# Patient Record
Sex: FEMALE | Race: WHITE | NOT HISPANIC OR LATINO | Employment: FULL TIME | URBAN - NONMETROPOLITAN AREA
[De-identification: names, ages, dates, MRNs, and addresses within clinical notes are randomized per-mention and may not be internally consistent; named-entity substitution may affect disease eponyms.]

---

## 2016-09-27 LAB — EXTERNAL HEPATITIS B SURFACE ANTIGEN: NEGATIVE

## 2016-09-29 LAB
EXTERNAL ABO GROUPING: NORMAL
EXTERNAL ANTIBODY SCREEN: NEGATIVE
EXTERNAL GROUP B STREP ANTIGEN: NEGATIVE
EXTERNAL RH FACTOR: POSITIVE
EXTERNAL RUBELLA QUALITATIVE: NORMAL
EXTERNAL SYPHILIS RPR SCREEN: NEGATIVE
HIV1 AB SPEC QL IA.RAPID: NEGATIVE

## 2017-04-11 ENCOUNTER — APPOINTMENT (OUTPATIENT)
Dept: ULTRASOUND IMAGING | Facility: HOSPITAL | Age: 37
End: 2017-04-11

## 2017-04-11 ENCOUNTER — HOSPITAL ENCOUNTER (OUTPATIENT)
Facility: HOSPITAL | Age: 37
Setting detail: OBSERVATION
Discharge: HOME | End: 2017-04-11
Attending: OBSTETRICS & GYNECOLOGY | Admitting: OBSTETRICS & GYNECOLOGY

## 2017-04-11 VITALS
RESPIRATION RATE: 18 BRPM | HEART RATE: 105 BPM | DIASTOLIC BLOOD PRESSURE: 76 MMHG | SYSTOLIC BLOOD PRESSURE: 140 MMHG | TEMPERATURE: 97.8 F

## 2017-04-11 PROBLEM — Z34.90 PREGNANT AND NOT YET DELIVERED: Status: ACTIVE | Noted: 2017-04-11

## 2017-04-11 LAB
ABO GROUP BLD: NORMAL
BLD GP AB SCN SERPL QL: NEGATIVE
DEPRECATED RDW RBC AUTO: 46 FL (ref 40–54)
ERYTHROCYTE [DISTWIDTH] IN BLOOD BY AUTOMATED COUNT: 14.8 % (ref 12–15)
HCT VFR BLD AUTO: 35.8 % (ref 37–47)
HGB BLD-MCNC: 12.3 G/DL (ref 12–16)
LYMPHOCYTES # BLD MANUAL: 1.08 10*3/MM3 (ref 0.72–4.86)
LYMPHOCYTES NFR BLD MANUAL: 10 % (ref 15–45)
LYMPHOCYTES NFR BLD MANUAL: 8 % (ref 4–12)
MCH RBC QN AUTO: 29.5 PG (ref 28–32)
MCHC RBC AUTO-ENTMCNC: 34.4 G/DL (ref 33–36)
MCV RBC AUTO: 85.9 FL (ref 82–98)
MONOCYTES # BLD AUTO: 0.86 10*3/MM3 (ref 0.19–1.3)
NEUTROPHILS # BLD AUTO: 8.65 10*3/MM3 (ref 1.87–8.4)
NEUTROPHILS NFR BLD MANUAL: 75 % (ref 39–78)
NEUTS BAND NFR BLD MANUAL: 5 % (ref 0–10)
PLAT MORPH BLD: NORMAL
PLATELET # BLD AUTO: 219 10*3/MM3 (ref 130–400)
PMV BLD AUTO: 11 FL (ref 6–12)
POIKILOCYTOSIS BLD QL SMEAR: ABNORMAL
RBC # BLD AUTO: 4.17 10*6/MM3 (ref 4.2–5.4)
RH BLD: POSITIVE
VARIANT LYMPHS NFR BLD MANUAL: 2 % (ref 0–5)
WBC MORPH BLD: NORMAL
WBC NRBC COR # BLD: 10.81 10*3/MM3 (ref 4.8–10.8)

## 2017-04-11 PROCEDURE — G0463 HOSPITAL OUTPT CLINIC VISIT: HCPCS

## 2017-04-11 PROCEDURE — G0378 HOSPITAL OBSERVATION PER HR: HCPCS

## 2017-04-11 PROCEDURE — 85027 COMPLETE CBC AUTOMATED: CPT | Performed by: OBSTETRICS & GYNECOLOGY

## 2017-04-11 PROCEDURE — 85007 BL SMEAR W/DIFF WBC COUNT: CPT | Performed by: OBSTETRICS & GYNECOLOGY

## 2017-04-11 PROCEDURE — 86901 BLOOD TYPING SEROLOGIC RH(D): CPT | Performed by: OBSTETRICS & GYNECOLOGY

## 2017-04-11 PROCEDURE — 76818 FETAL BIOPHYS PROFILE W/NST: CPT

## 2017-04-11 PROCEDURE — 86850 RBC ANTIBODY SCREEN: CPT | Performed by: OBSTETRICS & GYNECOLOGY

## 2017-04-11 PROCEDURE — 86900 BLOOD TYPING SEROLOGIC ABO: CPT | Performed by: OBSTETRICS & GYNECOLOGY

## 2017-04-11 RX ORDER — FERROUS SULFATE 325(65) MG
325 TABLET ORAL
COMMUNITY
End: 2017-04-24 | Stop reason: HOSPADM

## 2017-04-11 RX ADMIN — SODIUM CHLORIDE, POTASSIUM CHLORIDE, SODIUM LACTATE AND CALCIUM CHLORIDE 1000 ML: 600; 310; 30; 20 INJECTION, SOLUTION INTRAVENOUS at 11:33

## 2017-04-11 RX ADMIN — SODIUM CHLORIDE, POTASSIUM CHLORIDE, SODIUM LACTATE AND CALCIUM CHLORIDE 1000 ML: 600; 310; 30; 20 INJECTION, SOLUTION INTRAVENOUS at 11:05

## 2017-04-11 NOTE — NURSING NOTE
EDUCATED PATIENT ON REASONS TO RETURN TO LABOR AND DELIVERY INCLUDING LEAKING/GUSH OF FLUID, DECREASED FETAL MOVEMENT, VAGINAL BLEEDING, AND CONTRACTIONS

## 2017-04-21 RX ORDER — PROMETHAZINE HYDROCHLORIDE 25 MG/1
12.5 TABLET ORAL EVERY 6 HOURS PRN
Status: CANCELLED | OUTPATIENT
Start: 2017-04-21

## 2017-04-21 RX ORDER — ZOLPIDEM TARTRATE 5 MG/1
5 TABLET ORAL NIGHTLY PRN
Status: CANCELLED | OUTPATIENT
Start: 2017-04-21 | End: 2017-05-01

## 2017-04-21 RX ORDER — OXYCODONE HYDROCHLORIDE AND ACETAMINOPHEN 5; 325 MG/1; MG/1
2 TABLET ORAL EVERY 4 HOURS PRN
Status: CANCELLED | OUTPATIENT
Start: 2017-04-21 | End: 2017-05-01

## 2017-04-21 RX ORDER — TERBUTALINE SULFATE 1 MG/ML
0.25 INJECTION, SOLUTION SUBCUTANEOUS AS NEEDED
Status: CANCELLED | OUTPATIENT
Start: 2017-04-21

## 2017-04-21 RX ORDER — OXYTOCIN/0.9 % SODIUM CHLORIDE 30/500 ML
2 PLASTIC BAG, INJECTION (ML) INTRAVENOUS
Status: CANCELLED | OUTPATIENT
Start: 2017-04-21 | End: 2017-04-22

## 2017-04-21 RX ORDER — PROMETHAZINE HYDROCHLORIDE 12.5 MG/1
12.5 SUPPOSITORY RECTAL EVERY 6 HOURS PRN
Status: CANCELLED | OUTPATIENT
Start: 2017-04-21

## 2017-04-21 RX ORDER — ACETAMINOPHEN 325 MG/1
650 TABLET ORAL EVERY 4 HOURS PRN
Status: CANCELLED | OUTPATIENT
Start: 2017-04-21

## 2017-04-21 RX ORDER — PROMETHAZINE HYDROCHLORIDE 25 MG/ML
12.5 INJECTION, SOLUTION INTRAMUSCULAR; INTRAVENOUS EVERY 6 HOURS PRN
Status: CANCELLED | OUTPATIENT
Start: 2017-04-21

## 2017-04-21 RX ORDER — CALCIUM CARBONATE 200(500)MG
2 TABLET,CHEWABLE ORAL 3 TIMES DAILY PRN
Status: CANCELLED | OUTPATIENT
Start: 2017-04-21

## 2017-04-21 RX ORDER — HYDROCODONE BITARTRATE AND ACETAMINOPHEN 5; 325 MG/1; MG/1
1 TABLET ORAL EVERY 4 HOURS PRN
Status: CANCELLED | OUTPATIENT
Start: 2017-04-21 | End: 2017-05-01

## 2017-04-21 RX ORDER — OXYTOCIN/RINGER'S LACTATE 20/1000 ML
999 PLASTIC BAG, INJECTION (ML) INTRAVENOUS ONCE
Status: CANCELLED | OUTPATIENT
Start: 2017-04-21 | End: 2017-04-21

## 2017-04-21 NOTE — H&P
"      Patient Care Team:  Tisha Gonzalez DO as PCP - General (Family Medicine)         Subjective     History of Present Illness     36 year old female presents at term gestation for induction of labor.  Prenatal complicated by gestational edema with proteinuria  AMA    Review of Systems   Constitutional: Negative.    HENT: Negative.    Eyes: Negative.    Respiratory: Negative.    Cardiovascular: Negative.    Gastrointestinal: Negative.    Endocrine: Negative.    Genitourinary: Negative.    Musculoskeletal: Negative.    Skin: Negative.    Allergic/Immunologic: Negative.    Neurological: Negative.    Hematological: Negative.    Psychiatric/Behavioral: Negative.       PMHX:  Childhood asthma  PSH:  Denies  SHX:  Denies tobacco, drug or alcohol usage  NKDA  FHX:  CAD  GYNHX:  Neg  OBHX:    FT   Med:  PNV    Objective      Vital Signs  Ht:  67.5\"  Wt:  200  BP: 148/88  P:  90  R:  17  Physical Exam   Constitutional: She is oriented to person, place, and time. She appears well-developed and well-nourished.   Eyes: EOM are normal.   Neck: Normal range of motion. Neck supple.   Cardiovascular: Normal rate, regular rhythm, normal heart sounds and intact distal pulses.    Pulmonary/Chest: Effort normal and breath sounds normal.   Abdominal: Soft. Bowel sounds are normal.   Genitourinary: Vagina normal.   Genitourinary Comments: Uterus gravid nontender  VE:  2/80/-2   Musculoskeletal: Normal range of motion. She exhibits edema.   Neurological: She is alert and oriented to person, place, and time. She has normal reflexes.   Skin: Skin is warm.   Psychiatric: She has a normal mood and affect. Her behavior is normal. Judgment and thought content normal.   Vitals reviewed.      Results Review:   I reviewed the patient's new clinical results.      Assessment & Plan    I discussed the patients findings and my recommendations with patient  1.  Pregnancy at 39+ weeks gestation  2.  AMA  3.  Gestational edema with " proteinuria    1.  Induction of labor    Gloria Gutierrez DO  04/21/17  1:36 PM

## 2017-04-22 ENCOUNTER — ANESTHESIA (OUTPATIENT)
Dept: LABOR AND DELIVERY | Facility: HOSPITAL | Age: 37
End: 2017-04-22

## 2017-04-22 ENCOUNTER — HOSPITAL ENCOUNTER (INPATIENT)
Facility: HOSPITAL | Age: 37
LOS: 2 days | Discharge: HOME | End: 2017-04-24
Attending: OBSTETRICS & GYNECOLOGY | Admitting: OBSTETRICS & GYNECOLOGY

## 2017-04-22 ENCOUNTER — ANESTHESIA EVENT (OUTPATIENT)
Dept: LABOR AND DELIVERY | Facility: HOSPITAL | Age: 37
End: 2017-04-22

## 2017-04-22 DIAGNOSIS — Z3A.38 38 WEEKS GESTATION OF PREGNANCY: ICD-10-CM

## 2017-04-22 DIAGNOSIS — Z34.93 PREGNANT AND NOT YET DELIVERED, THIRD TRIMESTER: Primary | ICD-10-CM

## 2017-04-22 LAB
A1 MICROGLOB PLACENTAL VAG QL: POSITIVE
ABO GROUP BLD: NORMAL
BLD GP AB SCN SERPL QL: NEGATIVE
DEPRECATED RDW RBC AUTO: 45.6 FL (ref 40–54)
ERYTHROCYTE [DISTWIDTH] IN BLOOD BY AUTOMATED COUNT: 14.7 % (ref 12–15)
HCT VFR BLD AUTO: 36.6 % (ref 37–47)
HGB BLD-MCNC: 12.4 G/DL (ref 12–16)
MCH RBC QN AUTO: 29 PG (ref 28–32)
MCHC RBC AUTO-ENTMCNC: 33.9 G/DL (ref 33–36)
MCV RBC AUTO: 85.7 FL (ref 82–98)
PLATELET # BLD AUTO: 247 10*3/MM3 (ref 130–400)
PMV BLD AUTO: 11 FL (ref 6–12)
RBC # BLD AUTO: 4.27 10*6/MM3 (ref 4.2–5.4)
RH BLD: POSITIVE
WBC NRBC COR # BLD: 11.81 10*3/MM3 (ref 4.8–10.8)

## 2017-04-22 PROCEDURE — 59409 OBSTETRICAL CARE: CPT | Performed by: OBSTETRICS & GYNECOLOGY

## 2017-04-22 PROCEDURE — 86900 BLOOD TYPING SEROLOGIC ABO: CPT | Performed by: OBSTETRICS & GYNECOLOGY

## 2017-04-22 PROCEDURE — 25010000002 ROPIVACAINE PER 1 MG: Performed by: NURSE ANESTHETIST, CERTIFIED REGISTERED

## 2017-04-22 PROCEDURE — C1755 CATHETER, INTRASPINAL: HCPCS | Performed by: NURSE ANESTHETIST, CERTIFIED REGISTERED

## 2017-04-22 PROCEDURE — 0KQM0ZZ REPAIR PERINEUM MUSCLE, OPEN APPROACH: ICD-10-PCS | Performed by: OBSTETRICS & GYNECOLOGY

## 2017-04-22 PROCEDURE — 86901 BLOOD TYPING SEROLOGIC RH(D): CPT | Performed by: OBSTETRICS & GYNECOLOGY

## 2017-04-22 PROCEDURE — 85027 COMPLETE CBC AUTOMATED: CPT | Performed by: OBSTETRICS & GYNECOLOGY

## 2017-04-22 PROCEDURE — 4A1HX4Z MONITORING OF PRODUCTS OF CONCEPTION, CARDIAC ELECTRICAL ACTIVITY, EXTERNAL APPROACH: ICD-10-PCS | Performed by: OBSTETRICS & GYNECOLOGY

## 2017-04-22 PROCEDURE — 86850 RBC ANTIBODY SCREEN: CPT | Performed by: OBSTETRICS & GYNECOLOGY

## 2017-04-22 PROCEDURE — 84112 EVAL AMNIOTIC FLUID PROTEIN: CPT | Performed by: OBSTETRICS & GYNECOLOGY

## 2017-04-22 PROCEDURE — 88307 TISSUE EXAM BY PATHOLOGIST: CPT | Performed by: OBSTETRICS & GYNECOLOGY

## 2017-04-22 RX ORDER — PROMETHAZINE HYDROCHLORIDE 12.5 MG/1
12.5 SUPPOSITORY RECTAL EVERY 6 HOURS PRN
Status: DISCONTINUED | OUTPATIENT
Start: 2017-04-22 | End: 2017-04-23 | Stop reason: HOSPADM

## 2017-04-22 RX ORDER — TERBUTALINE SULFATE 1 MG/ML
0.25 INJECTION, SOLUTION SUBCUTANEOUS AS NEEDED
Status: DISCONTINUED | OUTPATIENT
Start: 2017-04-22 | End: 2017-04-23 | Stop reason: HOSPADM

## 2017-04-22 RX ORDER — PROMETHAZINE HYDROCHLORIDE 25 MG/ML
12.5 INJECTION, SOLUTION INTRAMUSCULAR; INTRAVENOUS EVERY 6 HOURS PRN
Status: DISCONTINUED | OUTPATIENT
Start: 2017-04-22 | End: 2017-04-23 | Stop reason: HOSPADM

## 2017-04-22 RX ORDER — PROMETHAZINE HYDROCHLORIDE 25 MG/1
12.5 TABLET ORAL EVERY 6 HOURS PRN
Status: DISCONTINUED | OUTPATIENT
Start: 2017-04-22 | End: 2017-04-23 | Stop reason: HOSPADM

## 2017-04-22 RX ORDER — HYDROCODONE BITARTRATE AND ACETAMINOPHEN 5; 325 MG/1; MG/1
1 TABLET ORAL EVERY 4 HOURS PRN
Status: DISCONTINUED | OUTPATIENT
Start: 2017-04-22 | End: 2017-04-23 | Stop reason: HOSPADM

## 2017-04-22 RX ORDER — SODIUM CHLORIDE, SODIUM LACTATE, POTASSIUM CHLORIDE, CALCIUM CHLORIDE 600; 310; 30; 20 MG/100ML; MG/100ML; MG/100ML; MG/100ML
125 INJECTION, SOLUTION INTRAVENOUS CONTINUOUS
Status: DISCONTINUED | OUTPATIENT
Start: 2017-04-22 | End: 2017-04-23

## 2017-04-22 RX ORDER — OXYTOCIN/RINGER'S LACTATE 20/1000 ML
125 PLASTIC BAG, INJECTION (ML) INTRAVENOUS CONTINUOUS PRN
Status: DISCONTINUED | OUTPATIENT
Start: 2017-04-22 | End: 2017-04-23

## 2017-04-22 RX ORDER — IBUPROFEN 800 MG/1
800 TABLET ORAL EVERY 8 HOURS PRN
Status: DISCONTINUED | OUTPATIENT
Start: 2017-04-22 | End: 2017-04-23 | Stop reason: HOSPADM

## 2017-04-22 RX ORDER — ROPIVACAINE HYDROCHLORIDE 2 MG/ML
INJECTION, SOLUTION EPIDURAL; INFILTRATION; PERINEURAL AS NEEDED
Status: DISCONTINUED | OUTPATIENT
Start: 2017-04-22 | End: 2017-04-23 | Stop reason: SURG

## 2017-04-22 RX ORDER — METHYLERGONOVINE MALEATE 0.2 MG/ML
200 INJECTION INTRAVENOUS AS NEEDED
Status: DISCONTINUED | OUTPATIENT
Start: 2017-04-22 | End: 2017-04-23 | Stop reason: HOSPADM

## 2017-04-22 RX ORDER — LIDOCAINE HYDROCHLORIDE AND EPINEPHRINE 15; 5 MG/ML; UG/ML
INJECTION, SOLUTION EPIDURAL AS NEEDED
Status: DISCONTINUED | OUTPATIENT
Start: 2017-04-22 | End: 2017-04-23 | Stop reason: SURG

## 2017-04-22 RX ORDER — BUTORPHANOL TARTRATE 1 MG/ML
1 INJECTION, SOLUTION INTRAMUSCULAR; INTRAVENOUS
Status: DISCONTINUED | OUTPATIENT
Start: 2017-04-22 | End: 2017-04-23 | Stop reason: HOSPADM

## 2017-04-22 RX ORDER — OXYTOCIN/RINGER'S LACTATE 20/1000 ML
999 PLASTIC BAG, INJECTION (ML) INTRAVENOUS ONCE
Status: COMPLETED | OUTPATIENT
Start: 2017-04-22 | End: 2017-04-22

## 2017-04-22 RX ORDER — MISOPROSTOL 200 UG/1
800 TABLET ORAL AS NEEDED
Status: DISCONTINUED | OUTPATIENT
Start: 2017-04-22 | End: 2017-04-23 | Stop reason: HOSPADM

## 2017-04-22 RX ORDER — SODIUM CHLORIDE 0.9 % (FLUSH) 0.9 %
1-10 SYRINGE (ML) INJECTION AS NEEDED
Status: DISCONTINUED | OUTPATIENT
Start: 2017-04-22 | End: 2017-04-23 | Stop reason: HOSPADM

## 2017-04-22 RX ORDER — OXYTOCIN/0.9 % SODIUM CHLORIDE 30/500 ML
2-30 PLASTIC BAG, INJECTION (ML) INTRAVENOUS
Status: DISCONTINUED | OUTPATIENT
Start: 2017-04-22 | End: 2017-04-23

## 2017-04-22 RX ORDER — CARBOPROST TROMETHAMINE 250 UG/ML
250 INJECTION, SOLUTION INTRAMUSCULAR AS NEEDED
Status: DISCONTINUED | OUTPATIENT
Start: 2017-04-22 | End: 2017-04-23 | Stop reason: HOSPADM

## 2017-04-22 RX ORDER — LIDOCAINE HYDROCHLORIDE 10 MG/ML
5 INJECTION, SOLUTION INFILTRATION; PERINEURAL AS NEEDED
Status: DISCONTINUED | OUTPATIENT
Start: 2017-04-22 | End: 2017-04-23 | Stop reason: HOSPADM

## 2017-04-22 RX ADMIN — SODIUM CHLORIDE, POTASSIUM CHLORIDE, SODIUM LACTATE AND CALCIUM CHLORIDE 125 ML/HR: 600; 310; 30; 20 INJECTION, SOLUTION INTRAVENOUS at 19:00

## 2017-04-22 RX ADMIN — ROPIVACAINE HYDROCHLORIDE 10 ML/HR: 2 INJECTION, SOLUTION EPIDURAL; INFILTRATION at 22:05

## 2017-04-22 RX ADMIN — LIDOCAINE HYDROCHLORIDE,EPINEPHRINE BITARTRATE 3 ML: 15; .005 INJECTION, SOLUTION EPIDURAL; INFILTRATION; INTRACAUDAL; PERINEURAL at 21:59

## 2017-04-22 RX ADMIN — SODIUM CHLORIDE, POTASSIUM CHLORIDE, SODIUM LACTATE AND CALCIUM CHLORIDE 125 ML/HR: 600; 310; 30; 20 INJECTION, SOLUTION INTRAVENOUS at 22:20

## 2017-04-22 RX ADMIN — ROPIVACAINE HYDROCHLORIDE 10 ML: 2 INJECTION, SOLUTION EPIDURAL; INFILTRATION at 22:05

## 2017-04-22 RX ADMIN — Medication 19.98 UNITS/HR: at 23:16

## 2017-04-22 RX ADMIN — OXYTOCIN-SODIUM CHLORIDE 0.9% IV SOLN 30 UNIT/500ML 2 MILLI-UNITS/MIN: 30-0.9/5 SOLUTION at 20:51

## 2017-04-23 LAB
BASOPHILS # BLD AUTO: 0.02 10*3/MM3 (ref 0–0.2)
BASOPHILS NFR BLD AUTO: 0.1 % (ref 0–2)
DEPRECATED RDW RBC AUTO: 46 FL (ref 40–54)
EOSINOPHIL # BLD AUTO: 0.04 10*3/MM3 (ref 0–0.7)
EOSINOPHIL NFR BLD AUTO: 0.3 % (ref 0–4)
ERYTHROCYTE [DISTWIDTH] IN BLOOD BY AUTOMATED COUNT: 14.6 % (ref 12–15)
HCT VFR BLD AUTO: 33.6 % (ref 37–47)
HGB BLD-MCNC: 11.4 G/DL (ref 12–16)
IMM GRANULOCYTES # BLD: 0.05 10*3/MM3 (ref 0–0.03)
IMM GRANULOCYTES NFR BLD: 0.3 % (ref 0–5)
LYMPHOCYTES # BLD AUTO: 2.19 10*3/MM3 (ref 0.72–4.86)
LYMPHOCYTES NFR BLD AUTO: 13.9 % (ref 15–45)
MCH RBC QN AUTO: 29 PG (ref 28–32)
MCHC RBC AUTO-ENTMCNC: 33.9 G/DL (ref 33–36)
MCV RBC AUTO: 85.5 FL (ref 82–98)
MONOCYTES # BLD AUTO: 1.15 10*3/MM3 (ref 0.19–1.3)
MONOCYTES NFR BLD AUTO: 7.3 % (ref 4–12)
NEUTROPHILS # BLD AUTO: 12.25 10*3/MM3 (ref 1.87–8.4)
NEUTROPHILS NFR BLD AUTO: 78.1 % (ref 39–78)
PLATELET # BLD AUTO: 218 10*3/MM3 (ref 130–400)
PMV BLD AUTO: 10.5 FL (ref 6–12)
RBC # BLD AUTO: 3.93 10*6/MM3 (ref 4.2–5.4)
WBC NRBC COR # BLD: 15.7 10*3/MM3 (ref 4.8–10.8)

## 2017-04-23 PROCEDURE — 85025 COMPLETE CBC W/AUTO DIFF WBC: CPT | Performed by: OBSTETRICS & GYNECOLOGY

## 2017-04-23 PROCEDURE — 90715 TDAP VACCINE 7 YRS/> IM: CPT | Performed by: OBSTETRICS & GYNECOLOGY

## 2017-04-23 PROCEDURE — 25010000002 TDAP 5-2.5-18.5 LF-MCG/0.5 SUSPENSION: Performed by: OBSTETRICS & GYNECOLOGY

## 2017-04-23 PROCEDURE — 90471 IMMUNIZATION ADMIN: CPT | Performed by: OBSTETRICS & GYNECOLOGY

## 2017-04-23 RX ORDER — PROMETHAZINE HYDROCHLORIDE 25 MG/ML
12.5 INJECTION, SOLUTION INTRAMUSCULAR; INTRAVENOUS EVERY 6 HOURS PRN
Status: DISCONTINUED | OUTPATIENT
Start: 2017-04-23 | End: 2017-04-24 | Stop reason: HOSPADM

## 2017-04-23 RX ORDER — PRENATAL VIT/IRON FUM/FOLIC AC 27MG-0.8MG
1 TABLET ORAL DAILY
Status: DISCONTINUED | OUTPATIENT
Start: 2017-04-23 | End: 2017-04-24 | Stop reason: HOSPADM

## 2017-04-23 RX ORDER — CALCIUM CARBONATE 200(500)MG
2 TABLET,CHEWABLE ORAL 3 TIMES DAILY PRN
Status: DISCONTINUED | OUTPATIENT
Start: 2017-04-23 | End: 2017-04-24 | Stop reason: HOSPADM

## 2017-04-23 RX ORDER — OXYCODONE HYDROCHLORIDE AND ACETAMINOPHEN 5; 325 MG/1; MG/1
2 TABLET ORAL EVERY 4 HOURS PRN
Status: DISCONTINUED | OUTPATIENT
Start: 2017-04-23 | End: 2017-04-24 | Stop reason: HOSPADM

## 2017-04-23 RX ORDER — SODIUM CHLORIDE 0.9 % (FLUSH) 0.9 %
1-10 SYRINGE (ML) INJECTION AS NEEDED
Status: DISCONTINUED | OUTPATIENT
Start: 2017-04-23 | End: 2017-04-24 | Stop reason: HOSPADM

## 2017-04-23 RX ORDER — PROMETHAZINE HYDROCHLORIDE 12.5 MG/1
12.5 SUPPOSITORY RECTAL EVERY 6 HOURS PRN
Status: DISCONTINUED | OUTPATIENT
Start: 2017-04-23 | End: 2017-04-24 | Stop reason: HOSPADM

## 2017-04-23 RX ORDER — BISACODYL 10 MG
10 SUPPOSITORY, RECTAL RECTAL DAILY PRN
Status: DISCONTINUED | OUTPATIENT
Start: 2017-04-23 | End: 2017-04-24 | Stop reason: HOSPADM

## 2017-04-23 RX ORDER — HYDROCODONE BITARTRATE AND ACETAMINOPHEN 5; 325 MG/1; MG/1
1 TABLET ORAL EVERY 4 HOURS PRN
Status: DISCONTINUED | OUTPATIENT
Start: 2017-04-23 | End: 2017-04-24 | Stop reason: HOSPADM

## 2017-04-23 RX ORDER — ZOLPIDEM TARTRATE 5 MG/1
5 TABLET ORAL NIGHTLY PRN
Status: DISCONTINUED | OUTPATIENT
Start: 2017-04-23 | End: 2017-04-24 | Stop reason: HOSPADM

## 2017-04-23 RX ORDER — PROMETHAZINE HYDROCHLORIDE 25 MG/1
12.5 TABLET ORAL EVERY 6 HOURS PRN
Status: DISCONTINUED | OUTPATIENT
Start: 2017-04-23 | End: 2017-04-24 | Stop reason: HOSPADM

## 2017-04-23 RX ORDER — DOCUSATE SODIUM 100 MG/1
100 CAPSULE, LIQUID FILLED ORAL 2 TIMES DAILY
Status: DISCONTINUED | OUTPATIENT
Start: 2017-04-23 | End: 2017-04-24 | Stop reason: HOSPADM

## 2017-04-23 RX ORDER — HYDROCODONE BITARTRATE AND ACETAMINOPHEN 5; 325 MG/1; MG/1
1 TABLET ORAL EVERY 4 HOURS PRN
Status: DISCONTINUED | OUTPATIENT
Start: 2017-04-23 | End: 2017-04-23 | Stop reason: SDUPTHER

## 2017-04-23 RX ORDER — PROMETHAZINE HYDROCHLORIDE 25 MG/1
25 TABLET ORAL EVERY 6 HOURS PRN
Status: DISCONTINUED | OUTPATIENT
Start: 2017-04-23 | End: 2017-04-24 | Stop reason: HOSPADM

## 2017-04-23 RX ORDER — IBUPROFEN 800 MG/1
800 TABLET ORAL EVERY 8 HOURS PRN
Status: DISCONTINUED | OUTPATIENT
Start: 2017-04-23 | End: 2017-04-24 | Stop reason: HOSPADM

## 2017-04-23 RX ORDER — ACETAMINOPHEN 325 MG/1
650 TABLET ORAL EVERY 4 HOURS PRN
Status: DISCONTINUED | OUTPATIENT
Start: 2017-04-23 | End: 2017-04-24 | Stop reason: HOSPADM

## 2017-04-23 RX ADMIN — ACETAMINOPHEN 650 MG: 325 TABLET ORAL at 08:50

## 2017-04-23 RX ADMIN — IBUPROFEN 800 MG: 800 TABLET, FILM COATED ORAL at 03:13

## 2017-04-23 RX ADMIN — IBUPROFEN 800 MG: 800 TABLET, FILM COATED ORAL at 14:35

## 2017-04-23 RX ADMIN — DOCUSATE SODIUM 100 MG: 100 CAPSULE, LIQUID FILLED ORAL at 18:23

## 2017-04-23 RX ADMIN — TETANUS TOXOID, REDUCED DIPHTHERIA TOXOID AND ACELLULAR PERTUSSIS VACCINE, ADSORBED 0.5 ML: 5; 2.5; 8; 8; 2.5 SUSPENSION INTRAMUSCULAR at 16:51

## 2017-04-23 RX ADMIN — DOCUSATE SODIUM 100 MG: 100 CAPSULE, LIQUID FILLED ORAL at 08:43

## 2017-04-23 RX ADMIN — Medication 125 ML/HR: at 00:21

## 2017-04-23 RX ADMIN — PRENATAL VIT W/ FE FUMARATE-FA TAB 27-0.8 MG 1 TABLET: 27-0.8 TAB at 08:43

## 2017-04-23 NOTE — ANESTHESIA POSTPROCEDURE EVALUATION
Patient: Triston Galeas    Procedure Summary     Date Anesthesia Start Anesthesia Stop Room / Location    04/22/17 8743 [id number]        Procedure Diagnosis Scheduled Providers Provider    LABOR ANALGESIA No diagnosis on file.  Mauro Canales CRNA          Anesthesia Type: epidural  Last vitals  BP      Temp      Pulse     Resp      SpO2        Post Anesthesia Care and Evaluation    Patient location during evaluation: floor  Patient participation: complete - patient participated  Level of consciousness: awake and alert  Pain score: 1  Pain management: adequate  Airway patency: patent  Anesthetic complications: No anesthetic complications    Cardiovascular status: acceptable  Respiratory status: room air  Hydration status: acceptable  Post Neuraxial Block status: Motor and sensory function returned to baseline and No signs or symptoms of PDPH

## 2017-04-23 NOTE — ANESTHESIA PREPROCEDURE EVALUATION
Anesthesia Evaluation     NPO Status: > 6 hours   Airway   Mallampati: I  TM distance: >3 FB  Neck ROM: full  no difficulty expected  Dental - normal exam     Pulmonary - negative pulmonary ROS and normal exam   Cardiovascular - negative cardio ROS and normal exam        Neuro/Psych- negative ROS  GI/Hepatic/Renal/Endo - negative ROS     Musculoskeletal (-) negative ROS    Abdominal  - normal exam   Substance History - negative use     OB/GYN    (+) Pregnant,     Comment:  247      Other - negative ROS                                   Anesthesia Plan    ASA 2     epidural     Anesthetic plan and risks discussed with patient and spouse/significant other.

## 2017-04-23 NOTE — ANESTHESIA PROCEDURE NOTES
Labor Epidural    Patient location during procedure: OB  Performed By  CRNA: PRINCE MCKINNEY  Preanesthetic Checklist  Completed: patient identified, site marked, surgical consent, pre-op evaluation, timeout performed, IV checked, risks and benefits discussed and monitors and equipment checked  Additional Notes  X 2  ATTEMPT, HEIDI WITH SALINE  Epidural Block Prep:  Pt Position:sitting  Sterile Tech:gloves, mask and sterile barrier  Prep:DuraPrep  Monitoring:blood pressure monitoring and continuous pulse oximetry  Epidural Block Procedure:  Approach:midline  Guidance:landmark technique and palpation technique  Location:L3-L4  Needle Type:Tuohy  Needle Gauge:17 G  Loss of Resistance: 8cm  Cath Depth at skin:12 cm  Aspiration:negative  Test Dose:negative  Post Assessment:  Dressing:occlusive dressing applied and secured with tape  Pt Tolerance:patient tolerated the procedure well with no apparent complications  Complications:yes

## 2017-04-24 VITALS
HEART RATE: 86 BPM | SYSTOLIC BLOOD PRESSURE: 131 MMHG | WEIGHT: 199 LBS | BODY MASS INDEX: 31.23 KG/M2 | HEIGHT: 67 IN | OXYGEN SATURATION: 100 % | RESPIRATION RATE: 16 BRPM | TEMPERATURE: 98.4 F | DIASTOLIC BLOOD PRESSURE: 73 MMHG

## 2017-04-24 RX ORDER — IBUPROFEN 800 MG/1
800 TABLET ORAL EVERY 8 HOURS PRN
Qty: 90 TABLET | Refills: 0 | Status: SHIPPED | OUTPATIENT
Start: 2017-04-24 | End: 2020-01-23

## 2017-04-24 RX ORDER — PRENATAL VIT/IRON FUM/FOLIC AC 27MG-0.8MG
1 TABLET ORAL DAILY
Qty: 30 TABLET | Refills: 3 | Status: SHIPPED | OUTPATIENT
Start: 2017-04-24 | End: 2020-01-23

## 2017-04-24 RX ADMIN — DOCUSATE SODIUM 100 MG: 100 CAPSULE, LIQUID FILLED ORAL at 08:44

## 2017-04-24 RX ADMIN — PRENATAL VIT W/ FE FUMARATE-FA TAB 27-0.8 MG 1 TABLET: 27-0.8 TAB at 08:44

## 2017-04-25 ENCOUNTER — HOSPITAL ENCOUNTER (OUTPATIENT)
Dept: LABOR AND DELIVERY | Facility: HOSPITAL | Age: 37
End: 2017-04-25

## 2017-04-26 ENCOUNTER — HOSPITAL ENCOUNTER (OUTPATIENT)
Dept: LABOR AND DELIVERY | Facility: HOSPITAL | Age: 37
End: 2017-04-26

## 2017-04-26 ENCOUNTER — HOSPITAL ENCOUNTER (OUTPATIENT)
Dept: LACTATION | Facility: HOSPITAL | Age: 37
Discharge: HOME | End: 2017-04-26

## 2017-04-26 LAB
CYTO UR: NORMAL
LAB AP CASE REPORT: NORMAL
LAB AP CLINICAL INFORMATION: NORMAL
Lab: NORMAL
PATH REPORT.FINAL DX SPEC: NORMAL
PATH REPORT.GROSS SPEC: NORMAL

## 2017-04-26 NOTE — LACTATION NOTE
Infant, Nikhil, 4 days old  Liliana Rivera  :  17  Birth Weight:  7-10.4  3470G  DC Weight:  7-6.4 3557G  3.3% weight loss at DC    Weight today:  7-10.3  3468 G    Back to birth weight.    Parents presented for weight check only.  Triston states BFing going well. No pain, nipple trauma or issues with latch. She is feeding every 2-3 hours both breasts per her report. BF'ed 3 year old daughter. No concerns. Lactation contact card and invitation to [facility] given. Much praise for early BFing success.    Weight info faxed to Dr. Ford 17, 10:30 am.

## 2018-10-03 ENCOUNTER — OFFICE VISIT (OUTPATIENT)
Dept: RETAIL CLINIC | Facility: CLINIC | Age: 38
End: 2018-10-03

## 2018-10-03 VITALS
RESPIRATION RATE: 18 BRPM | BODY MASS INDEX: 24.77 KG/M2 | TEMPERATURE: 98.3 F | HEIGHT: 68 IN | HEART RATE: 84 BPM | WEIGHT: 163.4 LBS | DIASTOLIC BLOOD PRESSURE: 70 MMHG | SYSTOLIC BLOOD PRESSURE: 120 MMHG | OXYGEN SATURATION: 99 %

## 2018-10-03 DIAGNOSIS — J02.9 ACUTE PHARYNGITIS, UNSPECIFIED ETIOLOGY: Primary | ICD-10-CM

## 2018-10-03 DIAGNOSIS — R05.9 COUGHING: ICD-10-CM

## 2018-10-03 PROCEDURE — 99213 OFFICE O/P EST LOW 20 MIN: CPT | Performed by: NURSE PRACTITIONER

## 2018-10-03 RX ORDER — AMOXICILLIN 875 MG/1
875 TABLET, COATED ORAL 2 TIMES DAILY
Qty: 20 TABLET | Refills: 0 | Status: SHIPPED | OUTPATIENT
Start: 2018-10-03 | End: 2018-10-13

## 2018-10-03 RX ORDER — CETIRIZINE HYDROCHLORIDE 10 MG/1
10 TABLET ORAL DAILY
COMMUNITY
End: 2018-12-13 | Stop reason: ALTCHOICE

## 2018-10-03 NOTE — PATIENT INSTRUCTIONS
Pharyngitis  Pharyngitis is redness, pain, and swelling (inflammation) of the throat (pharynx). It is a very common cause of sore throat. Pharyngitis can be caused by a bacteria, but it is usually caused by a virus. Most cases of pharyngitis get better on their own without treatment.  What are the causes?  This condition may be caused by:  · Infection by viruses (viral). Viral pharyngitis spreads from person to person (is contagious) through coughing, sneezing, and sharing of personal items or utensils such as cups, forks, spoons, and toothbrushes.  · Infection by bacteria (bacterial). Bacterial pharyngitis may be spread by touching the nose or face after coming in contact with the bacteria, or through more intimate contact, such as kissing.  · Allergies. Allergies can cause buildup of mucus in the throat (post-nasal drip), leading to inflammation and irritation. Allergies can also cause blocked nasal passages, forcing breathing through the mouth, which dries and irritates the throat.    What increases the risk?  You are more likely to develop this condition if:  · You are 5-24 years old.  · You are exposed to crowded environments such as , school, or dormitory living.  · You live in a cold climate.  · You have a weakened disease-fighting (immune) system.    What are the signs or symptoms?  Symptoms of this condition vary by the cause (viral, bacterial, or allergies) and can include:  · Sore throat.  · Fatigue.  · Low-grade fever.  · Headache.  · Joint pain and muscle aches.  · Skin rashes.  · Swollen glands in the throat (lymph nodes).  · Plaque-like film on the throat or tonsils. This is often a symptom of bacterial pharyngitis.  · Vomiting.  · Stuffy nose (nasal congestion).  · Cough.  · Red, itchy eyes (conjunctivitis).  · Loss of appetite.    How is this diagnosed?  This condition is often diagnosed based on your medical history and a physical exam. Your health care provider will ask you questions about  your illness and your symptoms. A swab of your throat may be done to check for bacteria (rapid strep test). Other lab tests may also be done, depending on the suspected cause, but these are rare.  How is this treated?  This condition usually gets better in 3-4 days without medicine. Bacterial pharyngitis may be treated with antibiotic medicines.  Follow these instructions at home:  · Take over-the-counter and prescription medicines only as told by your health care provider.  ? If you were prescribed an antibiotic medicine, take it as told by your health care provider. Do not stop taking the antibiotic even if you start to feel better.  ? Do not give children aspirin because of the association with Reye syndrome.  · Drink enough water and fluids to keep your urine clear or pale yellow.  · Get a lot of rest.  · Gargle with a salt-water mixture 3-4 times a day or as needed. To make a salt-water mixture, completely dissolve ½-1 tsp of salt in 1 cup of warm water.  · If your health care provider approves, you may use throat lozenges or sprays to soothe your throat.  Contact a health care provider if:  · You have large, tender lumps in your neck.  · You have a rash.  · You cough up green, yellow-brown, or bloody spit.  Get help right away if:  · Your neck becomes stiff.  · You drool or are unable to swallow liquids.  · You cannot drink or take medicines without vomiting.  · You have severe pain that does not go away, even after you take medicine.  · You have trouble breathing, and it is not caused by a stuffy nose.  · You have new pain and swelling in your joints such as the knees, ankles, wrists, or elbows.  Summary  · Pharyngitis is redness, pain, and swelling (inflammation) of the throat (pharynx).  · While pharyngitis can be caused by a bacteria, the most common causes are viral.  · Most cases of pharyngitis get better on their own without treatment.  · Bacterial pharyngitis is treated with antibiotic medicines.  This  information is not intended to replace advice given to you by your health care provider. Make sure you discuss any questions you have with your health care provider.  Document Released: 12/18/2006 Document Revised: 01/23/2018 Document Reviewed: 01/23/2018  [employer] Patient Education © 2018 [employer]    Due to the duration of and worsening of your symptoms an antibiotic has been prescribed.  Increase fluid intake.  Gargle warm salt water as needed for comfort. Continue Ibuprofen if needed.  Consider switching Zyrtec to Xyzal as discussed.  Add Flonase if needed for allergies.     If symptoms persist or worsen please follow up with your primary care provider.

## 2018-10-03 NOTE — PROGRESS NOTES
"  Chief Complaint   Patient presents with   • Cough   • Sore Throat     Subjective   Triston Galeas is a 37 y.o. female who presents to the clinic today with complaints of sore throat and cough.   Sore Throat    This is a new problem. Episode onset: about two weeks ago. The problem has been gradually worsening. There has been no fever. Associated symptoms include coughing and ear pain (both ears). Pertinent negatives include no abdominal pain, congestion, diarrhea, headaches, shortness of breath, trouble swallowing or vomiting. Treatments tried: allergy med.     Current Outpatient Prescriptions:   •  Multiple Vitamins-Minerals (MULTIVITAMIN ADULT PO), Take  by mouth., Disp: , Rfl:   •  Probiotic Product (PROBIOTIC DAILY PO), Take  by mouth., Disp: , Rfl:   Zyrtec daily    Allergies:  Patient has no known allergies.    Past Medical History:   Diagnosis Date   • Allergic    • Hx of degenerative disc disease      History reviewed. No pertinent surgical history.  Family History   Problem Relation Age of Onset   • Heart disease Mother      Social History   Substance Use Topics   • Smoking status: Never Smoker   • Smokeless tobacco: Not on file   • Alcohol use No       Review of Systems  Review of Systems   Constitutional: Negative for chills, fatigue and fever.   HENT: Positive for ear pain (both ears), postnasal drip and sore throat. Negative for congestion, sinus pain and trouble swallowing.    Eyes: Negative.    Respiratory: Positive for cough. Negative for shortness of breath and wheezing.    Gastrointestinal: Negative for abdominal pain, constipation, diarrhea, nausea and vomiting.   Allergic/Immunologic: Positive for environmental allergies.   Neurological: Negative for headaches.       Objective   /70   Pulse 84   Temp 98.3 °F (36.8 °C) (Oral)   Resp 18   Ht 171.5 cm (67.5\")   Wt 74.1 kg (163 lb 6.4 oz)   LMP 09/23/2018 (Exact Date)   SpO2 99%   BMI 25.21 kg/m²       Physical Exam "   Constitutional: She is oriented to person, place, and time. She appears well-developed and well-nourished. She is cooperative.  Non-toxic appearance. No distress.   HENT:   Head: Normocephalic and atraumatic.   Right Ear: External ear and ear canal normal. Tympanic membrane is not perforated, not erythematous, not retracted and not bulging. A middle ear effusion is present.   Left Ear: Tympanic membrane, external ear and ear canal normal. Tympanic membrane is not perforated, not erythematous, not retracted and not bulging.  No middle ear effusion.   Nose: Nose normal. No sinus tenderness.   Mouth/Throat: Uvula is midline and mucous membranes are normal. Posterior oropharyngeal erythema present. Tonsils are 1+ on the right. Tonsils are 1+ on the left. No tonsillar exudate.   Eyes: Pupils are equal, round, and reactive to light. Conjunctivae, EOM and lids are normal.   Neck: Trachea normal and normal range of motion. Neck supple.   Cardiovascular: Normal rate, regular rhythm, S1 normal, S2 normal and normal heart sounds.    Pulmonary/Chest: Effort normal and breath sounds normal. She has no wheezes. She has no rhonchi. She has no rales.   Lymphadenopathy:     She has no cervical adenopathy.   Neurological: She is alert and oriented to person, place, and time. Coordination and gait normal.   Skin: Skin is warm, dry and intact.   Psychiatric: She has a normal mood and affect. Her speech is normal and behavior is normal.   Vitals reviewed.      Assessment/Plan     Triston was seen today for cough and sore throat.    Diagnoses and all orders for this visit:    Acute pharyngitis, unspecified etiology    Coughing    Other orders  -     amoxicillin (AMOXIL) 875 MG tablet; Take 1 tablet by mouth 2 (Two) Times a Day for 10 days.      Due to the duration of and worsening of your symptoms an antibiotic has been prescribed.  Increase fluid intake.  Gargle warm salt water as needed for comfort. Continue Ibuprofen if needed.   Consider switching Zyrtec to Xyzal as discussed.  Add Flonase if needed for allergies.     If symptoms persist or worsen please follow up with your primary care provider.

## 2018-12-13 ENCOUNTER — OFFICE VISIT (OUTPATIENT)
Dept: RETAIL CLINIC | Facility: CLINIC | Age: 38
End: 2018-12-13

## 2018-12-13 VITALS
BODY MASS INDEX: 25.13 KG/M2 | HEIGHT: 68 IN | SYSTOLIC BLOOD PRESSURE: 114 MMHG | WEIGHT: 165.8 LBS | RESPIRATION RATE: 18 BRPM | DIASTOLIC BLOOD PRESSURE: 80 MMHG | TEMPERATURE: 98.4 F | HEART RATE: 80 BPM | OXYGEN SATURATION: 98 %

## 2018-12-13 DIAGNOSIS — J01.90 ACUTE NON-RECURRENT SINUSITIS, UNSPECIFIED LOCATION: Primary | ICD-10-CM

## 2018-12-13 DIAGNOSIS — R05.9 COUGHING: ICD-10-CM

## 2018-12-13 PROCEDURE — 99213 OFFICE O/P EST LOW 20 MIN: CPT | Performed by: NURSE PRACTITIONER

## 2018-12-13 RX ORDER — AMOXICILLIN AND CLAVULANATE POTASSIUM 875; 125 MG/1; MG/1
1 TABLET, FILM COATED ORAL 2 TIMES DAILY
Qty: 20 TABLET | Refills: 0 | Status: SHIPPED | OUTPATIENT
Start: 2018-12-13 | End: 2018-12-23

## 2018-12-13 RX ORDER — LEVOCETIRIZINE DIHYDROCHLORIDE 5 MG/1
5 TABLET, FILM COATED ORAL EVERY EVENING
COMMUNITY

## 2018-12-13 RX ORDER — METHYLPREDNISOLONE 4 MG/1
TABLET ORAL
Qty: 1 EACH | Refills: 0 | Status: SHIPPED | OUTPATIENT
Start: 2018-12-13 | End: 2020-01-23

## 2018-12-13 NOTE — PATIENT INSTRUCTIONS
An antibiotic has been prescribed for you that needs to be taken as directed for the full length of treatment. It is recommended that you take a probiotic when taking an antibiotic. Probiotics are found over the counter in pill form and in some brands of yogurt.      Risks and benefits of steroids discussed. Take only as prescribed.   In summary:   · Steroids can increase blood pressure and blood sugar.  · Steroids can cause increased irritability, increased energy & appetite, and may disrupt sleep.   · They may cause sun sensitivity. You should avoid prolonged sun exposure and use sunscreen if outdoors.   · Do not take additional NSAIDS such as ibuprofen, Motrin, Advil, Aleve, Naproxen while taking this medicine.  It is okay to take Tylenol while taking steroids.  · There are additional long term risks of steroids if used long term/frequently.    Increase fluid intake. Take Mucinex DM as needed to thin drainage and reduce cough.     If symptoms are not improving in a few days please follow up with Dr. Gonzalez.  Please seek medical attention sooner if worse.       Cough, Adult  Coughing is a reflex that clears your throat and your airways. Coughing helps to heal and protect your lungs. It is normal to cough occasionally, but a cough that happens with other symptoms or lasts a long time may be a sign of a condition that needs treatment. A cough may last only 2-3 weeks (acute), or it may last longer than 8 weeks (chronic).  What are the causes?  Coughing is commonly caused by:  · Breathing in substances that irritate your lungs.  · A viral or bacterial respiratory infection.  · Allergies.  · Asthma.  · Postnasal drip.  · Smoking.  · Acid backing up from the stomach into the esophagus (gastroesophageal reflux).  · Certain medicines.  · Chronic lung problems, including COPD (or rarely, lung cancer).  · Other medical conditions such as heart failure.    Follow these instructions at home:  Pay attention to any changes in  your symptoms. Take these actions to help with your discomfort:  · Take medicines only as told by your health care provider.  ? If you were prescribed an antibiotic medicine, take it as told by your health care provider. Do not stop taking the antibiotic even if you start to feel better.  ? Talk with your health care provider before you take a cough suppressant medicine.  · Drink enough fluid to keep your urine clear or pale yellow.  · If the air is dry, use a cold steam vaporizer or humidifier in your bedroom or your home to help loosen secretions.  · Avoid anything that causes you to cough at work or at home.  · If your cough is worse at night, try sleeping in a semi-upright position.  · Avoid cigarette smoke. If you smoke, quit smoking. If you need help quitting, ask your health care provider.  · Avoid caffeine.  · Avoid alcohol.  · Rest as needed.    Contact a health care provider if:  · You have new symptoms.  · You cough up pus.  · Your cough does not get better after 2-3 weeks, or your cough gets worse.  · You cannot control your cough with suppressant medicines and you are losing sleep.  · You develop pain that is getting worse or pain that is not controlled with pain medicines.  · You have a fever.  · You have unexplained weight loss.  · You have night sweats.  Get help right away if:  · You cough up blood.  · You have difficulty breathing.  · Your heartbeat is very fast.  This information is not intended to replace advice given to you by your health care provider. Make sure you discuss any questions you have with your health care provider.  Document Released: 06/15/2012 Document Revised: 05/25/2017 Document Reviewed: 02/24/2016  [employer] Interactive Patient Education © 2018 [employer]      Sinusitis, Adult  Sinusitis is soreness and inflammation of your sinuses. Sinuses are hollow spaces in the bones around your face. Your sinuses are located:  · Around your eyes.  · In the middle of your  forehead.  · Behind your nose.  · In your cheekbones.    Your sinuses and nasal passages are lined with a stringy fluid (mucus). Mucus normally drains out of your sinuses. When your nasal tissues become inflamed or swollen, the mucus can become trapped or blocked so air cannot flow through your sinuses. This allows bacteria, viruses, and funguses to grow, which leads to infection.  Sinusitis can develop quickly and last for 7?10 days (acute) or for more than 12 weeks (chronic). Sinusitis often develops after a cold.  What are the causes?  This condition is caused by anything that creates swelling in the sinuses or stops mucus from draining, including:  · Allergies.  · Asthma.  · Bacterial or viral infection.  · Abnormally shaped bones between the nasal passages.  · Nasal growths that contain mucus (nasal polyps).  · Narrow sinus openings.  · Pollutants, such as chemicals or irritants in the air.  · A foreign object stuck in the nose.  · A fungal infection. This is rare.    What increases the risk?  The following factors may make you more likely to develop this condition:  · Having allergies or asthma.  · Having had a recent cold or respiratory tract infection.  · Having structural deformities or blockages in your nose or sinuses.  · Having a weak immune system.  · Doing a lot of swimming or diving.  · Overusing nasal sprays.  · Smoking.    What are the signs or symptoms?  The main symptoms of this condition are pain and a feeling of pressure around the affected sinuses. Other symptoms include:  · Upper toothache.  · Earache.  · Headache.  · Bad breath.  · Decreased sense of smell and taste.  · A cough that may get worse at night.  · Fatigue.  · Fever.  · Thick drainage from your nose. The drainage is often green and it may contain pus (purulent).  · Stuffy nose or congestion.  · Postnasal drip. This is when extra mucus collects in the throat or back of the nose.  · Swelling and warmth over the affected  sinuses.  · Sore throat.  · Sensitivity to light.    How is this diagnosed?  This condition is diagnosed based on symptoms, a medical history, and a physical exam. To find out if your condition is acute or chronic, your health care provider may:  · Look in your nose for signs of nasal polyps.  · Tap over the affected sinus to check for signs of infection.  · View the inside of your sinuses using an imaging device that has a light attached (endoscope).    If your health care provider suspects that you have chronic sinusitis, you may also:  · Be tested for allergies.  · Have a sample of mucus taken from your nose (nasal culture) and checked for bacteria.  · Have a mucus sample examined to see if your sinusitis is related to an allergy.    If your sinusitis does not respond to treatment and it lasts longer than 8 weeks, you may have an MRI or CT scan to check your sinuses. These scans also help to determine how severe your infection is.  In rare cases, a bone biopsy may be done to rule out more serious types of fungal sinus disease.  How is this treated?  Treatment for sinusitis depends on the cause and whether your condition is chronic or acute. If a virus is causing your sinusitis, your symptoms will go away on their own within 10 days. You may be given medicines to relieve your symptoms, including:  · Topical nasal decongestants. They shrink swollen nasal passages and let mucus drain from your sinuses.  · Antihistamines. These drugs block inflammation that is triggered by allergies. This can help to ease swelling in your nose and sinuses.  · Topical nasal corticosteroids. These are nasal sprays that ease inflammation and swelling in your nose and sinuses.  · Nasal saline washes. These rinses can help to get rid of thick mucus in your nose.    If your condition is caused by bacteria, you will be given an antibiotic medicine. If your condition is caused by a fungus, you will be given an antifungal medicine.  Surgery  may be needed to correct underlying conditions, such as narrow nasal passages. Surgery may also be needed to remove polyps.  Follow these instructions at home:  Medicines  · Take, use, or apply over-the-counter and prescription medicines only as told by your health care provider. These may include nasal sprays.  · If you were prescribed an antibiotic medicine, take it as told by your health care provider. Do not stop taking the antibiotic even if you start to feel better.  Hydrate and Humidify  · Drink enough water to keep your urine clear or pale yellow. Staying hydrated will help to thin your mucus.  · Use a cool mist humidifier to keep the humidity level in your home above 50%.  · Inhale steam for 10-15 minutes, 3-4 times a day or as told by your health care provider. You can do this in the bathroom while a hot shower is running.  · Limit your exposure to cool or dry air.  Rest  · Rest as much as possible.  · Sleep with your head raised (elevated).  · Make sure to get enough sleep each night.  General instructions  · Apply a warm, moist washcloth to your face 3-4 times a day or as told by your health care provider. This will help with discomfort.  · Wash your hands often with soap and water to reduce your exposure to viruses and other germs. If soap and water are not available, use hand .  · Do not smoke. Avoid being around people who are smoking (secondhand smoke).  · Keep all follow-up visits as told by your health care provider. This is important.  Contact a health care provider if:  · You have a fever.  · Your symptoms get worse.  · Your symptoms do not improve within 10 days.  Get help right away if:  · You have a severe headache.  · You have persistent vomiting.  · You have pain or swelling around your face or eyes.  · You have vision problems.  · You develop confusion.  · Your neck is stiff.  · You have trouble breathing.  This information is not intended to replace advice given to you by your  health care provider. Make sure you discuss any questions you have with your health care provider.  Document Released: 12/18/2006 Document Revised: 08/13/2017 Document Reviewed: 10/12/2016  Elsevier Interactive Patient Education © 2018 Elsevier Inc.

## 2018-12-13 NOTE — PROGRESS NOTES
Chief Complaint   Patient presents with   • Cough     Subjective   Triston Galeas is a 38 y.o. female who presents to the clinic today with complaints of cough and chest congestion.   Cough   This is a new problem. Episode onset: 4-5 days ago. The problem has been gradually worsening. The cough is productive of sputum (thick green and did have some blood streaks this morning). Associated symptoms include nasal congestion and postnasal drip. Pertinent negatives include no chest pain, chills, ear congestion, ear pain, fever, headaches, sore throat, shortness of breath or wheezing. She has tried OTC cough suppressant for the symptoms. Her past medical history is significant for environmental allergies.     Current Outpatient Medications:   •  levocetirizine (XYZAL) 5 MG tablet, Take 5 mg by mouth Every Evening., Disp: , Rfl:   •  Multiple Vitamins-Minerals (MULTIVITAMIN ADULT PO), Take  by mouth., Disp: , Rfl:   •  Prenatal Vit-Fe Fumarate-FA (PRENATAL VITAMIN 27-0.8) 27-0.8 MG tablet tablet, Take 1 tablet by mouth Daily., Disp: 30 tablet, Rfl: 3  •  Probiotic Product (PROBIOTIC DAILY PO), Take  by mouth., Disp: , Rfl:   •  hydrocortisone (ANUSOL-HC) 2.5 % rectal cream, Insert 1 application into the rectum As Needed for Hemorrhoids., Disp: 1 each, Rfl: 0  •  ibuprofen (ADVIL,MOTRIN) 800 MG tablet, Take 1 tablet by mouth Every 8 (Eight) Hours As Needed for Mild Pain (1-3)., Disp: 90 tablet, Rfl: 0      Allergies:  Patient has no known allergies.    Past Medical History:   Diagnosis Date   • Allergic    • Hx of degenerative disc disease      No past surgical history on file.  Family History   Problem Relation Age of Onset   • Heart disease Mother      Social History     Tobacco Use   • Smoking status: Never Smoker   Substance Use Topics   • Alcohol use: No   • Drug use: No       Review of Systems  Review of Systems   Constitutional: Negative for chills and fever.   HENT: Positive for postnasal drip. Negative for  "ear pain and sore throat.    Respiratory: Positive for cough. Negative for shortness of breath and wheezing.    Cardiovascular: Negative for chest pain.   Gastrointestinal: Negative.    Allergic/Immunologic: Positive for environmental allergies.   Neurological: Negative for headaches.       Objective   /80   Pulse 80   Temp 98.4 °F (36.9 °C) (Oral)   Resp 18   Ht 171.5 cm (67.5\")   Wt 75.2 kg (165 lb 12.8 oz)   SpO2 98%   BMI 25.58 kg/m²       Physical Exam   Constitutional: She is oriented to person, place, and time. She appears well-developed and well-nourished. She is cooperative.   HENT:   Head: Normocephalic and atraumatic.   Right Ear: Tympanic membrane, external ear and ear canal normal.   Left Ear: Tympanic membrane, external ear and ear canal normal.   Nose: Mucosal edema and rhinorrhea (purulent appearing discharge) present. Right sinus exhibits no maxillary sinus tenderness and no frontal sinus tenderness. Left sinus exhibits no maxillary sinus tenderness and no frontal sinus tenderness.   Mouth/Throat: Uvula is midline and mucous membranes are normal. Posterior oropharyngeal erythema (large amount cream colored post nasal drip) present.   Eyes: Conjunctivae, EOM and lids are normal. Pupils are equal, round, and reactive to light.   Neck: Trachea normal and normal range of motion. Neck supple.   Cardiovascular: Normal rate, regular rhythm, S1 normal, S2 normal and normal heart sounds.   Pulmonary/Chest: Effort normal and breath sounds normal. She has no wheezes. She has no rhonchi. She has no rales.   Harsh bronchospastic cough   Lymphadenopathy:     She has no cervical adenopathy.   Neurological: She is alert and oriented to person, place, and time. Coordination and gait normal.   Skin: Skin is warm, dry and intact.   Psychiatric: She has a normal mood and affect. Her speech is normal and behavior is normal.       Assessment/Plan     Triston was seen today for cough.    Diagnoses and all " orders for this visit:    Acute non-recurrent sinusitis, unspecified location    Coughing    Other orders  -     amoxicillin-clavulanate (AUGMENTIN) 875-125 MG per tablet; Take 1 tablet by mouth 2 (Two) Times a Day for 10 days.  -     MethylPREDNISolone (MEDROL, NESSA,) 4 MG tablet; Take as directed on package instructions.      An antibiotic has been prescribed for you that needs to be taken as directed for the full length of treatment. It is recommended that you take a probiotic when taking an antibiotic. Probiotics are found over the counter in pill form and in some brands of yogurt.      Risks and benefits of steroids discussed. Take only as prescribed.   In summary:   · Steroids can increase blood pressure and blood sugar.  · Steroids can cause increased irritability, increased energy & appetite, and may disrupt sleep.   · They may cause sun sensitivity. You should avoid prolonged sun exposure and use sunscreen if outdoors.   · Do not take additional NSAIDS such as ibuprofen, Motrin, Advil, Aleve, Naproxen while taking this medicine.  It is okay to take Tylenol while taking steroids.  · There are additional long term risks of steroids if used long term/frequently.    Increase fluid intake. Take Mucinex DM as needed to thin drainage and reduce cough.     If symptoms are not improving in a few days please follow up with Dr. Gonzalez.  Please seek medical attention sooner if worse.

## 2019-12-20 ENCOUNTER — OFFICE VISIT (OUTPATIENT)
Dept: RETAIL CLINIC | Facility: CLINIC | Age: 39
End: 2019-12-20

## 2019-12-20 DIAGNOSIS — J02.9 ACUTE PHARYNGITIS, UNSPECIFIED ETIOLOGY: Primary | ICD-10-CM

## 2019-12-20 PROCEDURE — 99213 OFFICE O/P EST LOW 20 MIN: CPT | Performed by: NURSE PRACTITIONER

## 2020-01-09 NOTE — PROGRESS NOTES
Patient seen by contract labor provider, Patricia March. See scanned progress note under Media[facility]Nurses Notes.    This was a self-pay patient who paid at time of service.

## 2020-02-07 ENCOUNTER — OFFICE VISIT (OUTPATIENT)
Dept: OBGYN | Age: 40
End: 2020-02-07
Payer: COMMERCIAL

## 2020-02-07 VITALS
WEIGHT: 168 LBS | HEIGHT: 69 IN | DIASTOLIC BLOOD PRESSURE: 78 MMHG | SYSTOLIC BLOOD PRESSURE: 119 MMHG | HEART RATE: 78 BPM | BODY MASS INDEX: 24.88 KG/M2

## 2020-02-07 PROCEDURE — 99385 PREV VISIT NEW AGE 18-39: CPT | Performed by: NURSE PRACTITIONER

## 2020-02-07 PROCEDURE — G8484 FLU IMMUNIZE NO ADMIN: HCPCS | Performed by: NURSE PRACTITIONER

## 2020-02-07 SDOH — HEALTH STABILITY: MENTAL HEALTH: HOW OFTEN DO YOU HAVE A DRINK CONTAINING ALCOHOL?: NEVER

## 2020-02-07 ASSESSMENT — ENCOUNTER SYMPTOMS
DIARRHEA: 0
EYES NEGATIVE: 1
RESPIRATORY NEGATIVE: 1
ALLERGIC/IMMUNOLOGIC NEGATIVE: 1
CONSTIPATION: 0
GASTROINTESTINAL NEGATIVE: 1

## 2020-02-07 NOTE — PATIENT INSTRUCTIONS
feel your breast tissue before moving on to the next spot. ? Check your entire breast, moving up and down as if following a strip from the collarbone to the bra line, and from the armpit to the ribs. Repeat until you have covered the entire breast.  ? Repeat this procedure for your left breast, using the pads of the 3 middle fingers of your right hand. · To examine your breasts while in the shower:  ? Place one arm over your head and lightly soap your breast on that side. ? Using the pads of your fingers, gently move your hand over your breast (in the strip pattern described above), feeling carefully for any lumps or changes. ? Repeat for the other breast.  · Have your doctor inspect anything you notice to see if you need further testing. Where can you learn more? Go to https://chiraiseb.[employer]. org and sign in to your [employer] account. Enter P148 in the [employer] box to learn more about \"Breast Self-Exam: Care Instructions. \"     If you do not have an account, please click on the \"Sign Up Now\" link. Current as of: August 21, 2019  Content Version: 12.3  © 9962-4400 [employer]. Care instructions adapted under license by [facility] [employer] [facility] ([facility]). If you have questions about a medical condition or this instruction, always ask your healthcare professional. [place] [address] any warranty or liability for your use of this information. Patient Education        Well Visit, Ages 25 to 48: Care Instructions  Your Care Instructions    Physical exams can help you stay healthy. Your doctor has checked your overall health and may have suggested ways to take good care of yourself. He or she also may have recommended tests. At home, you can help prevent illness with healthy eating, regular exercise, and other steps. Follow-up care is a key part of your treatment and safety. Be sure to make and go to all appointments, and call your doctor if you are having problems.  It's also a good idea to know your test results and keep a list of the medicines you take. How can you care for yourself at home? · Reach and stay at a healthy weight. This will lower your risk for many problems, such as obesity, diabetes, heart disease, and high blood pressure. · Get at least 30 minutes of physical activity on most days of the week. Walking is a good choice. You also may want to do other activities, such as running, swimming, cycling, or playing tennis or team sports. Discuss any changes in your exercise program with your doctor. · Do not smoke or allow others to smoke around you. If you need help quitting, talk to your doctor about stop-smoking programs and medicines. These can increase your chances of quitting for good. · Talk to your doctor about whether you have any risk factors for sexually transmitted infections (STIs). Having one sex partner (who does not have STIs and does not have sex with anyone else) is a good way to avoid these infections. · Use birth control if you do not want to have children at this time. Talk with your doctor about the choices available and what might be best for you. · Protect your skin from too much sun. When you're outdoors from 10 a.m. to 4 p.m., stay in the shade or cover up with clothing and a hat with a wide brim. Wear sunglasses that block UV rays. Even when it's cloudy, put broad-spectrum sunscreen (SPF 30 or higher) on any exposed skin. · See a dentist one or two times a year for checkups and to have your teeth cleaned. · Wear a seat belt in the car. Follow your doctor's advice about when to have certain tests. These tests can spot problems early. For everyone  · Cholesterol. Have the fat (cholesterol) in your blood tested after age 21. Your doctor will tell you how often to have this done based on your age, family history, or other things that can increase your risk for heart disease. · Blood pressure.  Have your blood pressure checked during a routine doctor visit. Your doctor will tell you how often to check your blood pressure based on your age, your blood pressure results, and other factors. · Vision. Talk with your doctor about how often to have a glaucoma test.  · Diabetes. Ask your doctor whether you should have tests for diabetes. · Colon cancer. Your risk for colorectal cancer gets higher as you get older. Some experts say that adults should start regular screening at age 48 and stop at age 76. Others say to start before age 48 or continue after age 76. Talk with your doctor about your risk and when to start and stop screening. For women  · Breast exam and mammogram. Talk to your doctor about when you should have a clinical breast exam and a mammogram. Medical experts differ on whether and how often women under 50 should have these tests. Your doctor can help you decide what is right for you. · Cervical cancer screening test and pelvic exam. Begin with a Pap test at age 24. The test often is part of a pelvic exam. Starting at age 27, you may choose to have a Pap test, an HPV test, or both tests at the same time (called co-testing). Talk with your doctor about how often to have testing. · Tests for sexually transmitted infections (STIs). Ask whether you should have tests for STIs. You may be at risk if you have sex with more than one person, especially if your partners do not wear condoms. For men  · Tests for sexually transmitted infections (STIs). Ask whether you should have tests for STIs. You may be at risk if you have sex with more than one person, especially if you do not wear a condom. · Testicular cancer exam. Ask your doctor whether you should check your testicles regularly. · Prostate exam. Talk to your doctor about whether you should have a blood test (called a PSA test) for prostate cancer.  Experts differ on whether and when men should have this test. Some experts suggest it if you are older than 39 and are -American or have a father or brother who got prostate cancer when he was younger than 72. When should you call for help? Watch closely for changes in your health, and be sure to contact your doctor if you have any problems or symptoms that concern you. Where can you learn more? Go to https://chpeyanyeb.[employer]Beat [employer]. org and sign in to your "[employer] account. Enter P072 in the [employer] box to learn more about \"Well Visit, Ages 25 to 48: Care Instructions. \"     If you do not have an account, please click on the \"Sign Up Now\" link. Current as of: August 21, 2019  Content Version: 12.3  © 9427-2152 [employer]. Care instructions adapted under license by [facility] ([facility]). If you have questions about a medical condition or this instruction, always ask your healthcare professional. [address] any warranty or liability for your use of this information.

## 2020-02-07 NOTE — PROGRESS NOTES
Lowell Alvarado is a 44 y.o. female who presents today for her medical conditions/ complaints as noted below. Lowell Alvarado is c/o of Establish Care and Gynecologic Exam        HPI   New pt presents for annual exam and pap smear. Needs order for screening mammogram. Has Nexplanon for birth control. Having irregular periods. Due for removal in 3-6 months. Interested in another option for birth control and cycle control. Does not want anymore children. Does not see a PCP regularly. Has not had annual fasting labs. Mammo:NA  Pap smear:2019  Contraception: Nexplanon    P:2  Ab:0  Bone density:NA  Colonoscopy:NA  Patient's last menstrual period was 2020 (exact date).     History reviewed. No pertinent past medical history. History reviewed. No pertinent surgical history. History reviewed. No pertinent family history. Social History     Tobacco Use    Smoking status: Never Smoker    Smokeless tobacco: Never Used   Substance Use Topics    Alcohol use: Never     Frequency: Never       Current Outpatient Medications   Medication Sig Dispense Refill    etonogestrel (NEXPLANON) 68 MG implant 68 mg by Subdermal route once Indications: 2018       No current facility-administered medications for this visit. No Known Allergies  Vitals:    20 1019   BP: 119/78   Pulse: 78     Body mass index is 24.81 kg/m². Review of Systems   Constitutional: Negative. HENT: Negative. Eyes: Negative. Respiratory: Negative. Cardiovascular: Negative. Gastrointestinal: Negative. Negative for constipation and diarrhea. Endocrine: Negative. Genitourinary: Positive for menstrual problem (irregular d/t Nexplanon). Negative for frequency and urgency. Musculoskeletal: Negative. Skin: Negative. Allergic/Immunologic: Negative. Neurological: Negative. Hematological: Negative. Psychiatric/Behavioral: Negative.     All other systems reviewed and are negative. Physical Exam  Vitals signs and nursing note reviewed. Constitutional:       Appearance: She is well-developed. HENT:      Head: Normocephalic. Neck:      Musculoskeletal: Normal range of motion and neck supple. Thyroid: No thyroid mass or thyromegaly. Cardiovascular:      Rate and Rhythm: Normal rate and regular rhythm. Pulmonary:      Effort: Pulmonary effort is normal.      Breath sounds: Normal breath sounds. Chest:      Breasts:         Right: No inverted nipple, mass, nipple discharge or skin change. Left: No inverted nipple, mass, nipple discharge or skin change. Abdominal:      Palpations: Abdomen is soft. There is no mass. Tenderness: There is no abdominal tenderness. Genitourinary:     General: Normal vulva. Vagina: Normal.      Cervix: No cervical motion tenderness. Uterus: Normal. Not enlarged. Adnexa:         Right: No mass or tenderness. Left: No mass or tenderness. Comments: Pap collected  Musculoskeletal: Normal range of motion. Skin:     General: Skin is warm and dry. Neurological:      Mental Status: She is alert and oriented to person, place, and time. Psychiatric:         Attention and Perception: Attention normal.         Mood and Affect: Mood normal.         Speech: Speech normal.         Behavior: Behavior normal.         Thought Content: Thought content normal.         Cognition and Memory: Cognition normal.         Judgment: Judgment normal.          Diagnosis Orders   1. Women's annual routine gynecological examination  CBC    Comprehensive Metabolic Panel    Lipid Panel    T4, Free    TSH without Reflex   2. Screening for cervical cancer  PAP SMEAR    Human papillomavirus (HPV) DNA probe thin prep high risk   3. Screening for breast cancer  PORFIRIO DIGITAL SCREEN W CAD BILATERAL   4. Nexplanon in place     5.  Irregular menses         MEDICATIONS:  No orders of the defined types were placed in this encounter. ORDERS:  Orders Placed This Encounter   Procedures    PORFIRIO DIGITAL SCREEN W CAD BILATERAL    PAP SMEAR    Human papillomavirus (HPV) DNA probe thin prep high risk    CBC    Comprehensive Metabolic Panel    Lipid Panel    T4, Free    TSH without Reflex       PLAN:  Pap collected  Ordered baseline screening mammogram  Ordered annual fasting labs  Gave samples of Premarin for bleeding on Nexplanon  Checking benefits for IUD and will f/u for Nexplanon removal and IUD insertion    Patient Instructions   Patient Education        Breast Self-Exam: Care Instructions  Your Care Instructions    A breast self-exam is when you check your breasts for lumps or changes. This regular exam helps you learn how your breasts normally look and feel. Most breast problems or changes are not because of cancer. Breast self-exam is not a substitute for a mammogram. Having regular breast exams by your doctor and regular mammograms improve your chances of finding any problems with your breasts. Some women set a time each month to do a step-by-step breast self-exam. Other women like a less formal system. They might look at their breasts as they brush their teeth, or feel their breasts once in a while in the shower. If you notice a change in your breast, tell your doctor. Follow-up care is a key part of your treatment and safety. Be sure to make and go to all appointments, and call your doctor if you are having problems. It's also a good idea to know your test results and keep a list of the medicines you take. How do you do a breast self-exam?  · The best time to examine your breasts is usually one week after your menstrual period begins. Your breasts should not be tender then. If you do not have periods, you might do your exam on a day of the month that is easy to remember. · To examine your breasts:  ? Remove all your clothes above the waist and lie down.  When you are lying down, your breast tissue spreads evenly Education        Well Visit, Ages 25 to 48: Care Instructions  Your Care Instructions    Physical exams can help you stay healthy. Your doctor has checked your overall health and may have suggested ways to take good care of yourself. He or she also may have recommended tests. At home, you can help prevent illness with healthy eating, regular exercise, and other steps. Follow-up care is a key part of your treatment and safety. Be sure to make and go to all appointments, and call your doctor if you are having problems. It's also a good idea to know your test results and keep a list of the medicines you take. How can you care for yourself at home? · Reach and stay at a healthy weight. This will lower your risk for many problems, such as obesity, diabetes, heart disease, and high blood pressure. · Get at least 30 minutes of physical activity on most days of the week. Walking is a good choice. You also may want to do other activities, such as running, swimming, cycling, or playing tennis or team sports. Discuss any changes in your exercise program with your doctor. · Do not smoke or allow others to smoke around you. If you need help quitting, talk to your doctor about stop-smoking programs and medicines. These can increase your chances of quitting for good. · Talk to your doctor about whether you have any risk factors for sexually transmitted infections (STIs). Having one sex partner (who does not have STIs and does not have sex with anyone else) is a good way to avoid these infections. · Use birth control if you do not want to have children at this time. Talk with your doctor about the choices available and what might be best for you. · Protect your skin from too much sun. When you're outdoors from 10 a.m. to 4 p.m., stay in the shade or cover up with clothing and a hat with a wide brim. Wear sunglasses that block UV rays.  Even when it's cloudy, put broad-spectrum sunscreen (SPF 30 or higher) on any exposed skin.  · See a dentist one or two times a year for checkups and to have your teeth cleaned. · Wear a seat belt in the car. Follow your doctor's advice about when to have certain tests. These tests can spot problems early. For everyone  · Cholesterol. Have the fat (cholesterol) in your blood tested after age 21. Your doctor will tell you how often to have this done based on your age, family history, or other things that can increase your risk for heart disease. · Blood pressure. Have your blood pressure checked during a routine doctor visit. Your doctor will tell you how often to check your blood pressure based on your age, your blood pressure results, and other factors. · Vision. Talk with your doctor about how often to have a glaucoma test.  · Diabetes. Ask your doctor whether you should have tests for diabetes. · Colon cancer. Your risk for colorectal cancer gets higher as you get older. Some experts say that adults should start regular screening at age 48 and stop at age 76. Others say to start before age 48 or continue after age 76. Talk with your doctor about your risk and when to start and stop screening. For women  · Breast exam and mammogram. Talk to your doctor about when you should have a clinical breast exam and a mammogram. Medical experts differ on whether and how often women under 50 should have these tests. Your doctor can help you decide what is right for you. · Cervical cancer screening test and pelvic exam. Begin with a Pap test at age 24. The test often is part of a pelvic exam. Starting at age 27, you may choose to have a Pap test, an HPV test, or both tests at the same time (called co-testing). Talk with your doctor about how often to have testing. · Tests for sexually transmitted infections (STIs). Ask whether you should have tests for STIs. You may be at risk if you have sex with more than one person, especially if your partners do not wear condoms.   For men  · Tests for sexually transmitted infections (STIs). Ask whether you should have tests for STIs. You may be at risk if you have sex with more than one person, especially if you do not wear a condom. · Testicular cancer exam. Ask your doctor whether you should check your testicles regularly. · Prostate exam. Talk to your doctor about whether you should have a blood test (called a PSA test) for prostate cancer. Experts differ on whether and when men should have this test. Some experts suggest it if you are older than 39 and are -American or have a father or brother who got prostate cancer when he was younger than 72. When should you call for help? Watch closely for changes in your health, and be sure to contact your doctor if you have any problems or symptoms that concern you. Where can you learn more? Go to [email or web address] and sign in to your [employer] account. Enter P072 in the [employer] box to learn more about \"Well Visit, Ages 25 to 48: Care Instructions. \"     If you do not have an account, please click on the \"Sign Up Now\" link. Current as of: August 21, 2019  Content Version: 12.3  © 4341-6449 Healthwise, Incorporated. Care instructions adapted under license by [facility] ([facility]). If you have questions about a medical condition or this instruction, always ask your healthcare professional. [address] any warranty or liability for your use of this information.

## 2020-02-12 LAB
HPV COMMENT: NORMAL
HPV TYPE 16: NOT DETECTED
HPV TYPE 18: NOT DETECTED
HPVOH (OTHER TYPES): NOT DETECTED

## 2020-03-11 ENCOUNTER — TELEPHONE (OUTPATIENT)
Dept: OBGYN | Age: 40
End: 2020-03-11

## 2020-03-12 ENCOUNTER — PATIENT MESSAGE (OUTPATIENT)
Dept: OBGYN | Age: 40
End: 2020-03-12

## 2020-03-12 RX ORDER — MISOPROSTOL 100 UG/1
100 TABLET ORAL ONCE
Qty: 1 TABLET | Refills: 0 | Status: SHIPPED | OUTPATIENT
Start: 2020-03-12 | End: 2020-03-16

## 2020-03-12 NOTE — TELEPHONE ENCOUNTER
From: Nurse Abebe Kolb  To: Sarah Chandler  Sent: 3/12/2020 11:20 AM CDT  Subject: prescription card    Do you have a prescription card that is separate from your insurance card? It would have something on it like BIN#, RxPCN#, RxID# and RxGroup#. If you do, can you fax [phone]) it to our office or call [phone] [facility] pharmacy with that information to process the Mirena.  [place]

## 2020-03-16 ENCOUNTER — PATIENT MESSAGE (OUTPATIENT)
Dept: OBGYN | Age: 40
End: 2020-03-16

## 2020-03-16 ENCOUNTER — PROCEDURE VISIT (OUTPATIENT)
Dept: OBGYN | Age: 40
End: 2020-03-16
Payer: COMMERCIAL

## 2020-03-16 VITALS
DIASTOLIC BLOOD PRESSURE: 73 MMHG | WEIGHT: 170 LBS | HEART RATE: 73 BPM | BODY MASS INDEX: 26.68 KG/M2 | TEMPERATURE: 98.6 F | HEIGHT: 67 IN | SYSTOLIC BLOOD PRESSURE: 132 MMHG

## 2020-03-16 PROCEDURE — 99213 OFFICE O/P EST LOW 20 MIN: CPT | Performed by: NURSE PRACTITIONER

## 2020-03-16 PROCEDURE — G8484 FLU IMMUNIZE NO ADMIN: HCPCS | Performed by: NURSE PRACTITIONER

## 2020-03-16 PROCEDURE — 1036F TOBACCO NON-USER: CPT | Performed by: NURSE PRACTITIONER

## 2020-03-16 PROCEDURE — 58300 INSERT INTRAUTERINE DEVICE: CPT | Performed by: NURSE PRACTITIONER

## 2020-03-16 PROCEDURE — G8427 DOCREV CUR MEDS BY ELIG CLIN: HCPCS | Performed by: NURSE PRACTITIONER

## 2020-03-16 PROCEDURE — 11982 REMOVE DRUG IMPLANT DEVICE: CPT | Performed by: NURSE PRACTITIONER

## 2020-03-16 PROCEDURE — G8419 CALC BMI OUT NRM PARAM NOF/U: HCPCS | Performed by: NURSE PRACTITIONER

## 2020-03-16 ASSESSMENT — ENCOUNTER SYMPTOMS
RESPIRATORY NEGATIVE: 1
GASTROINTESTINAL NEGATIVE: 1
DIARRHEA: 0
EYES NEGATIVE: 1
CONSTIPATION: 0
ALLERGIC/IMMUNOLOGIC NEGATIVE: 1

## 2020-03-16 NOTE — PROGRESS NOTES
Renee Favre is a 44 y.o. female who presents today for her medical conditions/ complaints as noted below. Renee Favre is c/o of Procedure (Patient presents today for a Nexplanon removal and Mirena insertion.)        HPI    No LMP recorded. Patient has had an implant.     History reviewed. No pertinent past medical history. History reviewed. No pertinent surgical history. History reviewed. No pertinent family history. Social History     Tobacco Use    Smoking status: Never Smoker    Smokeless tobacco: Never Used   Substance Use Topics    Alcohol use: Never     Frequency: Never       Current Outpatient Medications   Medication Sig Dispense Refill    MIRENA, 52 MG, IUD 52 mg        No current facility-administered medications for this visit. No Known Allergies  Vitals:    20 1333   BP: 132/73   Pulse: 73   Temp: 98.6 °F (37 °C)     Body mass index is 26.63 kg/m². Review of Systems   Constitutional: Negative. HENT: Negative. Eyes: Negative. Respiratory: Negative. Cardiovascular: Negative. Gastrointestinal: Negative. Negative for constipation and diarrhea. Endocrine: Negative. Genitourinary: Positive for menstrual problem (irregular). Negative for frequency and urgency. Musculoskeletal: Negative. Skin: Negative. Allergic/Immunologic: Negative. Neurological: Negative. Hematological: Negative. Psychiatric/Behavioral: Negative. All other systems reviewed and are negative. Physical Exam  Vitals signs and nursing note reviewed. Constitutional:       Appearance: She is well-developed. HENT:      Head: Normocephalic. Right Ear: External ear normal.      Left Ear: External ear normal.      Nose: Nose normal.   Neck:      Musculoskeletal: Normal range of motion. Genitourinary:     General: Normal vulva.       Comments: IUD Insertion Procedure Note    /73 (Site: Left Upper Arm, Position: Sitting, Cuff Size: Medium Adult) Pulse 73   Temp 98.6 °F (37 °C) (Temporal)   Ht 5' 7\" (1.702 m)   Wt 170 lb (77.1 kg)   BMI 26.63 kg/m²     Pre-operative Diagnosis: IUD insertion    Post-operative Diagnosis: same    Indications: IUD insertion    Procedure Details    Urine pregnancy test was done and result was negative. The risks (including infection, bleeding, pain, and uterine perforation) and benefits of the procedure were explained to the patient and Written informed consent was obtained. Pt placed in lithotomy position. Speculum placed and cervix visualized. Cervix was cleansed with betadine. Single tooth tenaculum was placed on anterior lip. Uterus sounded to 8cm. IUD placed without difficulty. Strings trimmed. Patient tolerated procedure well. IUD Information:  Mirena, Lot # [id number], Expiration date 5/2022    Condition:  Stable    Complications:  None    Plan:    The patient was advised to call for any fever or for prolonged or severe pain or bleeding. She was advised to use OTC ibuprofen as needed for mild to moderate pain. Attending Physician Documentation:  I was present for or participated in the entire procedure, including opening and closing. Musculoskeletal: Normal range of motion. Arms:       Comments: LUE area cleaned w betadine. 0.5cc 1% lidocaine used to anesthetize area under implant. #11 blade scalpel used and approximately 0.5cm incision made to distal tip of Nexplanon. Tip of device visualized and grasped with pickup and removed without difficulty. Pressure applied, benzoin apple with 1/2inch steristrip and bandaid placed over site. Patient tolerated procedure without difficulty. Skin:     General: Skin is warm and dry. Neurological:      Mental Status: She is alert and oriented to person, place, and time.    Psychiatric:         Attention and Perception: Attention normal.         Mood and Affect: Mood normal.         Speech: Speech normal.         Behavior: Behavior normal. take more than one tablet at the same time. What should I avoid while taking levonorgestrel emergency contraceptive? Levonorgestrel emergency contraceptive will not protect you from sexually transmitted diseases--including HIV and AIDS. Using a condom is the only way to protect yourself from these diseases. Avoid having unprotected sex. What are the possible side effects of levonorgestrel emergency contraceptive? Get emergency medical help if you have signs of an allergic reaction: hives; difficulty breathing; swelling of your face, lips, tongue, or throat. Call your doctor or seek emergency medical help if you have severe pain in your lower stomach or side. This could be a sign of a tubal pregnancy (a pregnancy that implants in the fallopian tube instead of the uterus). A tubal pregnancy is a medical emergency. Common side effects may include:  · mild stomach pain;  · breast pain or tenderness;  · nausea, vomiting, diarrhea;  · headache, dizziness;  · feeling tired; or  · changes in your menstrual periods. This is not a complete list of side effects and others may occur. Call your doctor for medical advice about side effects. You may report side effects to FDA at 3-463-FDA-0954. What other drugs will affect levonorgestrel emergency contraceptive? Certain other medications can make levonorgestrel emergency contraceptive less effective, which may result in pregnancy. Ask a doctor or pharmacist if this medicine is safe to use if you are using any of the following medications:  · efavirenz;  · rifampin; or  · seizure medication --carbamazepine, felbamate, fosphenytoin, phenobarbital, phenytoin, primidone. This list is not complete. Other drugs may affect levonorgestrel, including prescription and over-the-counter medicines, vitamins, and herbal products. Not all possible drug interactions are listed here. Where can I get more information?   Your doctor or pharmacist can provide more information about levonorgestrel emergency contraceptive. Remember, keep this and all other medicines out of the reach of children, never share your medicines with others, and use this medication only for the indication prescribed. Every effort has been made to ensure that the information provided by [address] is accurate, up-to-date, and complete, but no guarantee is made to that effect. Drug information contained herein may be time sensitive. [facility] information has been compiled for use by healthcare practitioners and consumers in the [place] and therefore [facility] does not warrant that uses outside of the [place] are appropriate, unless specifically indicated otherwise. [facility]'s drug information does not endorse drugs, diagnose patients or recommend therapy. [facility]'s drug information is an informational resource designed to assist licensed healthcare practitioners in caring for their patients and/or to serve consumers viewing this service as a supplement to, and not a substitute for, the expertise, skill, knowledge and judgment of healthcare practitioners. The absence of a warning for a given drug or drug combination in no way should be construed to indicate that the drug or drug combination is safe, effective or appropriate for any given patient. [facility] does not assume any responsibility for any aspect of healthcare administered with the aid of information [facility] provides. The information contained herein is not intended to cover all possible uses, directions, precautions, warnings, drug interactions, allergic reactions, or adverse effects. If you have questions about the drugs you are taking, check with your doctor, nurse or pharmacist.  Copyright 8966-1042 [address] [place] [address] Dr MILLER. Version: 3.02. Revision date: 10/19/2018. Care instructions adapted under license by [facility] ([facility]).  If you have questions about a medical condition or this instruction, always ask your healthcare professional. [employer] disclaims any warranty or liability for your use of this information. Patient Education        Intrauterine Device (IUD) Insertion: Care Instructions  Your Care Instructions    The intrauterine device (IUD) is a very effective method of birth control. It is a small, plastic, T-shaped device that contains copper or hormones. The doctor inserts the IUD into your uterus. If you and your doctor discuss it before you give birth, this can be done right after you have your baby. A plastic string tied to the end of the IUD hangs down through the cervix into the vagina. There are two types of IUDs. The copper IUD is effective for up to 10 years. The hormonal IUD is effective for either 3 years or 6 years, depending on which IUD is used. But your doctor may talk to you about leaving it in for longer. The hormonal IUD also reduces menstrual bleeding and cramping. Both types of IUD most likely work by damaging or killing the man's sperm. This means that the woman's egg can't join with the sperm. IUDs may also change the lining of the uterus so that the egg does not lodge there. Follow-up care is a key part of your treatment and safety. Be sure to make and go to all appointments, and call your doctor if you are having problems. It's also a good idea to know your test results and keep a list of the medicines you take. How can you care for yourself at home? · It's safe to use while breastfeeding. · You may experience some mild cramping and light bleeding (spotting) for 1 or 2 days. Use a hot water bottle or a heating pad set on low on your belly for pain. · Take an over-the-counter pain medicine, such as acetaminophen (Tylenol), ibuprofen (Advil, Motrin), and naproxen (Aleve) if needed. Read and follow all instructions on the label. · Do not take two or more pain medicines at the same time unless the doctor told you to. Many pain medicines have acetaminophen, which is Tylenol.  Too much acetaminophen

## 2020-03-16 NOTE — PATIENT INSTRUCTIONS
breast-feeding. How should I take levonorgestrel emergency contraceptive? Use exactly as directed on the label, or as prescribed by your doctor. Levonorgestrel emergency contraceptive must be taken as soon as possible after unprotected sex (no later than 72 hours afterward). Call your doctor right away if you vomit within 2 hours after taking this medicine. Do not take a second dose without first asking your doctor. Visit your doctor within 3 weeks after taking levonorgestrel emergency contraceptive. A doctor should confirm that you are not pregnant, and that this medicine has not caused any harmful effects. If your period is late by 1 week or longer after the expected date, you may be pregnant. Get a pregnancy test and contact your doctor if you are pregnant. Levonorgestrel emergency contraceptive will not terminate pregnancy if the fertilized egg has attached to the uterus. Store at room temperature away from moisture and heat. What happens if I miss a dose? Since levonorgestrel emergency contraceptive is used as a single dose, it does not have a daily dosing schedule. What happens if I overdose? Because this medicine is supplied as a single tablet in an exact strength, an overdose is unlikely to occur when the levonorgestrel is used as directed. Do not take more than one tablet at the same time. What should I avoid while taking levonorgestrel emergency contraceptive? Levonorgestrel emergency contraceptive will not protect you from sexually transmitted diseases--including HIV and AIDS. Using a condom is the only way to protect yourself from these diseases. Avoid having unprotected sex. What are the possible side effects of levonorgestrel emergency contraceptive? Get emergency medical help if you have signs of an allergic reaction: hives; difficulty breathing; swelling of your face, lips, tongue, or throat.   Call your doctor or seek emergency medical help if you have severe pain in your lower chlamydia. An IUD does not protect you from STIs. Having one sex partner (who does not have STIs and does not have sex with anyone else) is a good way to avoid STIs. When should you call for help? Call 911 anytime you think you may need emergency care. For example, call if:    · You passed out (lost consciousness).     · You have sudden, severe pain in your belly or pelvis.    Call your doctor now or seek immediate medical care if:    · You have new belly or pelvic pain.     · You have severe vaginal bleeding. This means that you are soaking through your usual pads or tampons each hour for 2 or more hours.     · You are dizzy or lightheaded, or you feel like you may faint.     · You have a fever and pelvic pain or vaginal discharge.     · You have pelvic pain that is getting worse.    Watch closely for changes in your health, and be sure to contact your doctor if:    · You cannot feel the string, or the IUD comes out.     · You feel sick to your stomach, or you vomit.     · You think you may be pregnant. Where can you learn more? Go to https://GetMeMediapeSenseLabs (formerly Neurotopia).healthViyet. org and sign in to your iota Computing account. Enter Z970 in the [facility] box to learn more about \"Intrauterine Device (IUD) Insertion: Care Instructions. \"     If you do not have an account, please click on the \"Sign Up Now\" link. Current as of: May 29, 2019Content Version: 12.4  © 4533-0303 Healthwise, Incorporated. Care instructions adapted under license by [facility] ([facility]). If you have questions about a medical condition or this instruction, always ask your healthcare professional. [place] [address] any warranty or liability for your use of this information.

## 2020-03-17 ENCOUNTER — HOSPITAL ENCOUNTER (OUTPATIENT)
Dept: WOMENS IMAGING | Age: 40
Discharge: HOME | End: 2020-03-17
Payer: COMMERCIAL

## 2020-03-17 PROCEDURE — 77063 BREAST TOMOSYNTHESIS BI: CPT

## 2020-04-22 ENCOUNTER — TELEMEDICINE (OUTPATIENT)
Dept: OBGYN | Age: 40
End: 2020-04-22
Payer: COMMERCIAL

## 2020-04-22 PROCEDURE — G8428 CUR MEDS NOT DOCUMENT: HCPCS | Performed by: NURSE PRACTITIONER

## 2020-04-22 PROCEDURE — 99213 OFFICE O/P EST LOW 20 MIN: CPT | Performed by: NURSE PRACTITIONER

## 2020-04-22 ASSESSMENT — ENCOUNTER SYMPTOMS
DIARRHEA: 0
ALLERGIC/IMMUNOLOGIC NEGATIVE: 1
RESPIRATORY NEGATIVE: 1
GASTROINTESTINAL NEGATIVE: 1
EYES NEGATIVE: 1
CONSTIPATION: 0

## 2020-09-17 DIAGNOSIS — Z01.419 WOMEN'S ANNUAL ROUTINE GYNECOLOGICAL EXAMINATION: ICD-10-CM

## 2020-09-17 LAB
ALBUMIN SERPL-MCNC: 4.9 G/DL (ref 3.5–5.2)
ALP BLD-CCNC: 92 U/L (ref 35–104)
ALT SERPL-CCNC: 27 U/L (ref 5–33)
ANION GAP SERPL CALCULATED.3IONS-SCNC: 15 MMOL/L (ref 7–19)
AST SERPL-CCNC: 23 U/L (ref 5–32)
BILIRUB SERPL-MCNC: 0.9 MG/DL (ref 0.2–1.2)
BUN BLDV-MCNC: 15 MG/DL (ref 6–20)
CALCIUM SERPL-MCNC: 9.9 MG/DL (ref 8.6–10)
CHLORIDE BLD-SCNC: 100 MMOL/L (ref 98–111)
CHOLESTEROL, TOTAL: 205 MG/DL (ref 160–199)
CO2: 23 MMOL/L (ref 22–29)
CREAT SERPL-MCNC: 0.7 MG/DL (ref 0.5–0.9)
GFR AFRICAN AMERICAN: >59
GFR NON-AFRICAN AMERICAN: >60
GLUCOSE BLD-MCNC: 89 MG/DL (ref 74–109)
HCT VFR BLD CALC: 43.7 % (ref 37–47)
HDLC SERPL-MCNC: 58 MG/DL (ref 65–121)
HEMOGLOBIN: 14.6 G/DL (ref 12–16)
LDL CHOLESTEROL CALCULATED: 133 MG/DL
MCH RBC QN AUTO: 28.9 PG (ref 27–31)
MCHC RBC AUTO-ENTMCNC: 33.4 G/DL (ref 33–37)
MCV RBC AUTO: 86.5 FL (ref 81–99)
PDW BLD-RTO: 12.1 % (ref 11.5–14.5)
PLATELET # BLD: 355 K/UL (ref 130–400)
PMV BLD AUTO: 9.4 FL (ref 9.4–12.3)
POTASSIUM SERPL-SCNC: 4.5 MMOL/L (ref 3.5–5)
RBC # BLD: 5.05 M/UL (ref 4.2–5.4)
SODIUM BLD-SCNC: 138 MMOL/L (ref 136–145)
T4 FREE: 1.07 NG/DL (ref 0.93–1.7)
TOTAL PROTEIN: 8.2 G/DL (ref 6.6–8.7)
TRIGL SERPL-MCNC: 69 MG/DL (ref 0–149)
TSH SERPL DL<=0.05 MIU/L-ACNC: 1.98 UIU/ML (ref 0.27–4.2)
WBC # BLD: 7.3 K/UL (ref 4.8–10.8)

## 2021-06-17 ENCOUNTER — HOSPITAL ENCOUNTER (OUTPATIENT)
Dept: WOMENS IMAGING | Age: 41
Discharge: HOME | End: 2021-06-17
Payer: COMMERCIAL

## 2021-06-17 DIAGNOSIS — Z12.31 SCREENING MAMMOGRAM, ENCOUNTER FOR: ICD-10-CM

## 2021-06-17 PROCEDURE — 77067 SCR MAMMO BI INCL CAD: CPT

## 2022-07-11 ENCOUNTER — OFFICE VISIT (OUTPATIENT)
Dept: OBGYN CLINIC | Age: 42
End: 2022-07-11
Payer: COMMERCIAL

## 2022-07-11 VITALS
WEIGHT: 166 LBS | HEIGHT: 67 IN | DIASTOLIC BLOOD PRESSURE: 79 MMHG | BODY MASS INDEX: 26.06 KG/M2 | HEART RATE: 77 BPM | SYSTOLIC BLOOD PRESSURE: 117 MMHG

## 2022-07-11 DIAGNOSIS — Z01.419 WOMEN'S ANNUAL ROUTINE GYNECOLOGICAL EXAMINATION: Primary | ICD-10-CM

## 2022-07-11 DIAGNOSIS — Z30.431 INTRAUTERINE DEVICE SURVEILLANCE: ICD-10-CM

## 2022-07-11 DIAGNOSIS — Z12.31 ENCOUNTER FOR SCREENING MAMMOGRAM FOR MALIGNANT NEOPLASM OF BREAST: ICD-10-CM

## 2022-07-11 DIAGNOSIS — Z12.4 SCREENING FOR CERVICAL CANCER: ICD-10-CM

## 2022-07-11 DIAGNOSIS — Z12.39 SCREENING BREAST EXAMINATION: ICD-10-CM

## 2022-07-11 PROCEDURE — 99396 PREV VISIT EST AGE 40-64: CPT | Performed by: NURSE PRACTITIONER

## 2022-07-11 RX ORDER — FLUCONAZOLE 150 MG/1
150 TABLET ORAL
Qty: 2 TABLET | Refills: 0 | Status: SHIPPED | OUTPATIENT
Start: 2022-07-11 | End: 2022-09-08 | Stop reason: SDUPTHER

## 2022-07-11 ASSESSMENT — ENCOUNTER SYMPTOMS
DIARRHEA: 0
ALLERGIC/IMMUNOLOGIC NEGATIVE: 1
CONSTIPATION: 0
EYES NEGATIVE: 1
GASTROINTESTINAL NEGATIVE: 1
RESPIRATORY NEGATIVE: 1

## 2022-07-11 NOTE — PATIENT INSTRUCTIONS
Patient Education        Breast Self-Exam: Care Instructions  Your Care Instructions     A breast self-exam is when you check your breasts for lumps or changes. This regular exam helps you learn how your breasts normally look and feel. Mostbreast problems or changes are not because of cancer. Breast self-exam is not a substitute for a mammogram. Having regular breast exams by your doctor and regular mammograms improve your chances of finding anyproblems with your breasts. Some women set a time each month to do a step-by-step breast self-exam. Other women like a less formal system. They might look at their breasts as they brushtheir teeth, or feel their breasts once in a while in the shower. If you notice a change in your breast, tell your doctor. Follow-up care is a key part of your treatment and safety. Be sure to make and go to all appointments, and call your doctor if you are having problems. It's also a good idea to know your test results and keep alist of the medicines you take. How do you do a breast self-exam?   The best time to examine your breasts is usually one week after your menstrual period begins. Your breasts should not be tender then. If you do not have periods, you might do your exam on a day of the month that is easy to remember.  To examine your breasts:  ? Remove all your clothes above the waist and lie down. When you are lying down, your breast tissue spreads evenly over your chest wall, which makes it easier to feel all your breast tissue. ? Use the pads--not the fingertips--of the 3 middle fingers of your left hand to check your right breast. Move your fingers slowly in small coin-sized circles that overlap. ? Use three levels of pressure to feel of all your breast tissue. Use light pressure to feel the tissue close to the skin surface. Use medium pressure to feel a little deeper. Use firm pressure to feel your tissue close to your breastbone and ribs.  Use each pressure level to feel your breast tissue before moving on to the next spot. ? Check your entire breast, moving up and down as if following a strip from the collarbone to the bra line, and from the armpit to the ribs. Repeat until you have covered the entire breast.  ? Repeat this procedure for your left breast, using the pads of the 3 middle fingers of your right hand.  To examine your breasts while in the shower:  ? Place one arm over your head and lightly soap your breast on that side. ? Using the pads of your fingers, gently move your hand over your breast (in the strip pattern described above), feeling carefully for any lumps or changes. ? Repeat for the other breast.   Have your doctor inspect anything you notice to see if you need further testing. Where can you learn more? Go to https://2DuchepeDuos Technologieseb.Mavin. org and sign in to your Comic Wonder account. Enter P148 in the [employer] box to learn more about \"Breast Self-Exam: Care Instructions. \"     If you do not have an account, please click on the \"Sign Up Now\" link. Current as of: September 8, 2021               Content Version: 13.3  © 2006-2022 Recordant. Care instructions adapted under license by [facility] ([facility]). If you have questions about a medical condition or this instruction, always ask your healthcare professional. [place] [address] any warranty or liability for your use of this information. Patient Education        Well Visit, Ages 25 to 48: Care Instructions  Overview     Well visits can help you stay healthy. Your doctor has checked your overall health and may have suggested ways to take good care of yourself. Your doctor also may have recommended tests. At home, you can help prevent illness withhealthy eating, regular exercise, and other steps. Follow-up care is a key part of your treatment and safety. Be sure to make and go to all appointments, and call your doctor if you are having problems.  It's also pregnancy. Talk with your doctor about the choices available and what might be best for you.  If you think you may have a problem with alcohol or drug use, talk to your doctor. This includes prescription medicines (such as amphetamines and opioids) and illegal drugs (such as cocaine and methamphetamine). Your doctor can help you figure out what type of treatment is best for you.  Protect your skin from too much sun. When you're outdoors from 10 a.m. to 4 p.m., stay in the shade or cover up with clothing and a hat with a wide brim. Wear sunglasses that block UV rays. Even when it's cloudy, put broad-spectrum sunscreen (SPF 30 or higher) on any exposed skin.  See a dentist one or two times a year for checkups and to have your teeth cleaned.  Wear a seat belt in the car. When should you call for help? Watch closely for changes in your health, and be sure to contact your doctor if you have any problems or symptoms that concern you. Where can you learn more? Go to https://icix.Indel Therapeutics. org and sign in to your NantMobile account. Enter P072 in the zoojoo.BE box to learn more about \"Well Visit, Ages 25 to 48: Care Instructions. \"     If you do not have an account, please click on the \"Sign Up Now\" link. Current as of: October 6, 2021               Content Version: 13.3  © 6058-5403 Healthwise, Incorporated. Care instructions adapted under license by [facility] [place] ([facility]). If you have questions about a medical condition or this instruction, always ask your healthcare professional. [place] [address] any warranty or liability for your use of this information.

## 2022-07-11 NOTE — PROGRESS NOTES
Pt presents today for pap smear and breast exam.      Mammo:2021  Pap smear:  Contraception:Mirena     P:2  Ab:0  Bone density:NA   Colonoscopy:NA

## 2022-07-11 NOTE — PROGRESS NOTES
Britton Acevedo is a 39 y.o. female who presents today for her medical conditions/ complaints as noted below. Britton Acevedo is c/o of Annual Exam        HPI   Pt presents for annual exam and pap smear. Needs order for screening mammogram. Doing well with IUD. Recently labs with Dr Viviana mares. Mammo:2021  Pap smear:  Contraception:Mirena     P:2  Ab:0  Bone density:NA   Colonoscopy:NA  No LMP recorded. Patient has had an implant. [id number]    History reviewed. No pertinent past medical history. History reviewed. No pertinent surgical history. History reviewed. No pertinent family history. Social History     Tobacco Use    Smoking status: Never Smoker    Smokeless tobacco: Never Used   Substance Use Topics    Alcohol use: Never       Current Outpatient Medications   Medication Sig Dispense Refill    MIRENA, 52 MG, IUD 52 mg        No current facility-administered medications for this visit. No Known Allergies  Vitals:    22 1352   BP: 117/79   Pulse: 77     Body mass index is 26 kg/m². Review of Systems   Constitutional: Negative. HENT: Negative. Eyes: Negative. Respiratory: Negative. Cardiovascular: Negative. Gastrointestinal: Negative. Negative for constipation and diarrhea. Endocrine: Negative. Genitourinary: Negative. Negative for frequency, menstrual problem and urgency. Musculoskeletal: Negative. Skin: Negative. Allergic/Immunologic: Negative. Neurological: Negative. Hematological: Negative. Psychiatric/Behavioral: Negative. All other systems reviewed and are negative. Physical Exam  Vitals and nursing note reviewed. Constitutional:       Appearance: She is well-developed. HENT:      Head: Normocephalic. Neck:      Thyroid: No thyroid mass or thyromegaly. Cardiovascular:      Rate and Rhythm: Normal rate and regular rhythm.    Pulmonary:      Effort: Pulmonary effort is normal.      Breath sounds: Normal breath sounds. Chest:   Breasts:      Right: No inverted nipple, mass, nipple discharge or skin change. Left: No inverted nipple, mass, nipple discharge or skin change. Abdominal:      Palpations: Abdomen is soft. There is no mass. Tenderness: There is no abdominal tenderness. Genitourinary:     General: Normal vulva. Vagina: Normal.      Cervix: No cervical motion tenderness. Uterus: Normal. Not enlarged. Adnexa:         Right: No mass or tenderness. Left: No mass or tenderness. Comments: Pap collected  IUD strings present at external os  Musculoskeletal:         General: Normal range of motion. Cervical back: Normal range of motion and neck supple. Skin:     General: Skin is warm and dry. Neurological:      Mental Status: She is alert and oriented to person, place, and time. Psychiatric:         Attention and Perception: Attention normal.         Mood and Affect: Mood normal.         Speech: Speech normal.         Behavior: Behavior normal.         Thought Content: Thought content normal.         Cognition and Memory: Cognition normal.         Judgment: Judgment normal.          Diagnosis Orders   1. Women's annual routine gynecological examination     2. Screening for cervical cancer  PAP SMEAR    Human papillomavirus (HPV) DNA probe thin prep high risk   3. Encounter for screening mammogram for malignant neoplasm of breast  PORFIRIO DIGITAL SCREEN W OR WO CAD BILATERAL   4. Screening breast examination         MEDICATIONS:  No orders of the defined types were placed in this encounter.       ORDERS:  Orders Placed This Encounter   Procedures    PORFIRIO DIGITAL SCREEN W OR WO CAD BILATERAL    PAP SMEAR    Human papillomavirus (HPV) DNA probe thin prep high risk       PLAN:  Pap collected  Mammogram ordered    Patient Instructions   Patient Education        Breast Self-Exam: Care Instructions  Your Care Instructions     A breast self-exam is when you check your breasts for lumps or changes. This regular exam helps you learn how your breasts normally look and feel. Mostbreast problems or changes are not because of cancer. Breast self-exam is not a substitute for a mammogram. Having regular breast exams by your doctor and regular mammograms improve your chances of finding anyproblems with your breasts. Some women set a time each month to do a step-by-step breast self-exam. Other women like a less formal system. They might look at their breasts as they brushtheir teeth, or feel their breasts once in a while in the shower. If you notice a change in your breast, tell your doctor. Follow-up care is a key part of your treatment and safety. Be sure to make and go to all appointments, and call your doctor if you are having problems. It's also a good idea to know your test results and keep alist of the medicines you take. How do you do a breast self-exam?   The best time to examine your breasts is usually one week after your menstrual period begins. Your breasts should not be tender then. If you do not have periods, you might do your exam on a day of the month that is easy to remember.  To examine your breasts:  ? Remove all your clothes above the waist and lie down. When you are lying down, your breast tissue spreads evenly over your chest wall, which makes it easier to feel all your breast tissue. ? Use the pads--not the fingertips--of the 3 middle fingers of your left hand to check your right breast. Move your fingers slowly in small coin-sized circles that overlap. ? Use three levels of pressure to feel of all your breast tissue. Use light pressure to feel the tissue close to the skin surface. Use medium pressure to feel a little deeper. Use firm pressure to feel your tissue close to your breastbone and ribs. Use each pressure level to feel your breast tissue before moving on to the next spot. ?  Check your entire breast, moving up and down as if following a strip from the collarbone to the bra line, and from the armpit to the ribs. Repeat until you have covered the entire breast.  ? Repeat this procedure for your left breast, using the pads of the 3 middle fingers of your right hand.  To examine your breasts while in the shower:  ? Place one arm over your head and lightly soap your breast on that side. ? Using the pads of your fingers, gently move your hand over your breast (in the strip pattern described above), feeling carefully for any lumps or changes. ? Repeat for the other breast.   Have your doctor inspect anything you notice to see if you need further testing. Where can you learn more? Go to https://Gigantt.Ahorro Libre. org and sign in to your Pure360 account. Enter P148 in the [employer] box to learn more about \"Breast Self-Exam: Care Instructions. \"     If you do not have an account, please click on the \"Sign Up Now\" link. Current as of: September 8, 2021               Content Version: 13.3  © 2006-2022 Healthwise, Incorporated. Care instructions adapted under license by [facility] ([facility]). If you have questions about a medical condition or this instruction, always ask your healthcare professional. [place] [address] any warranty or liability for your use of this information.

## 2022-09-07 ENCOUNTER — PATIENT MESSAGE (OUTPATIENT)
Dept: OBGYN CLINIC | Age: 42
End: 2022-09-07

## 2022-09-08 RX ORDER — FLUCONAZOLE 150 MG/1
150 TABLET ORAL
Qty: 2 TABLET | Refills: 0 | Status: SHIPPED | OUTPATIENT
Start: 2022-09-08 | End: 2022-09-14

## 2022-09-08 NOTE — TELEPHONE ENCOUNTER
From: Montrell Hooks  To: Livier Sanchez  Sent: 9/7/2022 6:54 PM CDT  Subject: Recurring issue from last visit     Sorry to be playing phone tag. I thought a My chart message might be more convenient. When I was in to see Bekah Tate on July 11 she treated me for a yeast infection that is been battling since April. I did a two-dose treatment of meds and it went away. It has come back this weekend unfortunately and is causing quite a bit of pain and discomfort. Im not sure the best next course of action and was hoping you could help. Not sure if I need to come back in or if she will call in the same or another script. Just needing something because no OTC remedies are touching it at all! You can give me a call if needed at [phone].

## 2023-03-09 ENCOUNTER — OFFICE VISIT (OUTPATIENT)
Dept: OBGYN CLINIC | Age: 43
End: 2023-03-09
Payer: COMMERCIAL

## 2023-03-09 VITALS
HEART RATE: 76 BPM | BODY MASS INDEX: 26.84 KG/M2 | DIASTOLIC BLOOD PRESSURE: 89 MMHG | HEIGHT: 67 IN | SYSTOLIC BLOOD PRESSURE: 128 MMHG | WEIGHT: 171 LBS

## 2023-03-09 DIAGNOSIS — N89.8 VAGINAL ITCHING: Primary | ICD-10-CM

## 2023-03-09 DIAGNOSIS — L29.2 VULVAR ITCHING: ICD-10-CM

## 2023-03-09 PROCEDURE — 99213 OFFICE O/P EST LOW 20 MIN: CPT | Performed by: NURSE PRACTITIONER

## 2023-03-09 RX ORDER — ESTRADIOL 0.1 MG/G
1 CREAM VAGINAL
Qty: 1 EACH | Refills: 3 | Status: SHIPPED | OUTPATIENT
Start: 2023-03-09

## 2023-03-09 ASSESSMENT — ENCOUNTER SYMPTOMS
CONSTIPATION: 0
ALLERGIC/IMMUNOLOGIC NEGATIVE: 1
EYES NEGATIVE: 1
GASTROINTESTINAL NEGATIVE: 1
RESPIRATORY NEGATIVE: 1
DIARRHEA: 0

## 2023-03-09 NOTE — PROGRESS NOTES
Sarah Caldwell is a 43 y.o. female who presents today for her medical conditions/ complaints as noted below. Sarah Caldwell is c/o of Yeast Infection        HPI  Pt presents c/o intermittent vulvar and vaginal itching for 2 years. Will use OTC treatments like Monistat, Vagisil and will improve but then come back a few days later. No issues with lubrication with sex. Has had IUD x3 years. Denies discharge or odor. No new partners. Avoids tub baths and thong underwear. No LMP recorded. Patient has had an implant. [id number]    History reviewed. No pertinent past medical history. History reviewed. No pertinent surgical history. History reviewed. No pertinent family history. Social History     Tobacco Use    Smoking status: Never    Smokeless tobacco: Never   Substance Use Topics    Alcohol use: Never       Current Outpatient Medications   Medication Sig Dispense Refill    estradiol (ESTRACE VAGINAL) 0.1 MG/GM vaginal cream Place 1 g vaginally three times a week 1 each 3    MIRENA, 52 MG, IUD 52 mg        No current facility-administered medications for this visit. No Known Allergies  Vitals:    03/09/23 1033   BP: 128/89   Pulse: 76     Body mass index is 26.78 kg/m². Review of Systems   Constitutional: Negative. HENT: Negative. Eyes: Negative. Respiratory: Negative. Cardiovascular: Negative. Gastrointestinal: Negative. Negative for constipation and diarrhea. Endocrine: Negative. Genitourinary:  Positive for vaginal pain (itching). Negative for frequency, menstrual problem, urgency and vaginal discharge. Musculoskeletal: Negative. Skin: Negative. Allergic/Immunologic: Negative. Neurological: Negative. Hematological: Negative. Psychiatric/Behavioral: Negative. All other systems reviewed and are negative. Physical Exam  Vitals and nursing note reviewed. Constitutional:       Appearance: She is well-developed. HENT:      Head: Normocephalic. Right Ear: External ear normal.      Left Ear: External ear normal.      Nose: Nose normal.   Genitourinary:     Pubic Area: No rash. Labia:         Right: No rash or tenderness. Left: No rash or tenderness. Vagina: No erythema, tenderness or bleeding. Cervix: Normal.          Comments: Small fissure and erythema in perineum  Diatherix pending  Musculoskeletal:         General: Normal range of motion. Cervical back: Normal range of motion. Skin:     General: Skin is warm and dry. Neurological:      Mental Status: She is alert and oriented to person, place, and time. Psychiatric:         Attention and Perception: Attention normal.         Mood and Affect: Mood normal.         Speech: Speech normal.         Behavior: Behavior normal.         Thought Content: Thought content normal.         Cognition and Memory: Cognition normal.         Judgment: Judgment normal.        Diagnosis Orders   1. Vaginal itching        2. Vulvar itching            MEDICATIONS:  Orders Placed This Encounter   Medications    estradiol (ESTRACE VAGINAL) 0.1 MG/GM vaginal cream     Sig: Place 1 g vaginally three times a week     Dispense:  1 each     Refill:  3       ORDERS:  No orders of the defined types were placed in this encounter. PLAN:  Discussed exam findings, will start vaginal estrogen to perineum nightly x1-2 weeks then a couple nights a week- may be atrophic changes since on prog only birth control  Diatherix pending and will base treatment plan from diagnostics    There are no Patient Instructions on file for this visit.

## 2023-03-09 NOTE — PROGRESS NOTES
Pt states she has a yeast infection that will not go away she states 2 years ago in April this started did use OTC med and did not work. It will go away then come back.

## 2023-03-14 ENCOUNTER — PATIENT MESSAGE (OUTPATIENT)
Dept: OBGYN CLINIC | Age: 43
End: 2023-03-14

## 2023-03-15 DIAGNOSIS — L29.2 VULVAR ITCHING: ICD-10-CM

## 2023-03-15 DIAGNOSIS — N89.8 VAGINAL ITCHING: Primary | ICD-10-CM

## 2023-03-28 RX ORDER — METRONIDAZOLE 500 MG/1
500 TABLET ORAL 2 TIMES DAILY
Qty: 14 TABLET | Refills: 0 | Status: SHIPPED | OUTPATIENT
Start: 2023-03-28 | End: 2023-04-04

## 2023-03-28 NOTE — TELEPHONE ENCOUNTER
From: Lesia Butt  To: Jack Robertson  Sent: 3/14/2023 2:12 PM CDT  Subject: Checking in    [facility], just checking in to see if the culture showed anything from the appointment on Thursday and if there are any different courses of action to take. You can call me if you need at [phone].
no

## 2023-12-04 ENCOUNTER — OFFICE VISIT (OUTPATIENT)
Dept: OBGYN CLINIC | Age: 43
End: 2023-12-04
Payer: COMMERCIAL

## 2023-12-04 VITALS
HEART RATE: 79 BPM | WEIGHT: 173 LBS | BODY MASS INDEX: 27.1 KG/M2 | SYSTOLIC BLOOD PRESSURE: 130 MMHG | DIASTOLIC BLOOD PRESSURE: 85 MMHG

## 2023-12-04 DIAGNOSIS — Z01.419 WOMEN'S ANNUAL ROUTINE GYNECOLOGICAL EXAMINATION: Primary | ICD-10-CM

## 2023-12-04 DIAGNOSIS — Z30.431 INTRAUTERINE DEVICE SURVEILLANCE: ICD-10-CM

## 2023-12-04 DIAGNOSIS — Z12.39 SCREENING BREAST EXAMINATION: ICD-10-CM

## 2023-12-04 DIAGNOSIS — Z12.4 SCREENING FOR CERVICAL CANCER: ICD-10-CM

## 2023-12-04 DIAGNOSIS — Z12.31 ENCOUNTER FOR SCREENING MAMMOGRAM FOR MALIGNANT NEOPLASM OF BREAST: ICD-10-CM

## 2023-12-04 PROCEDURE — 99396 PREV VISIT EST AGE 40-64: CPT | Performed by: NURSE PRACTITIONER

## 2023-12-04 ASSESSMENT — ENCOUNTER SYMPTOMS
GASTROINTESTINAL NEGATIVE: 1
RESPIRATORY NEGATIVE: 1
EYES NEGATIVE: 1
DIARRHEA: 0
ALLERGIC/IMMUNOLOGIC NEGATIVE: 1
CONSTIPATION: 0

## 2023-12-04 ASSESSMENT — PATIENT HEALTH QUESTIONNAIRE - PHQ9
SUM OF ALL RESPONSES TO PHQ QUESTIONS 1-9: 0
2. FEELING DOWN, DEPRESSED OR HOPELESS: 0
2. FEELING DOWN, DEPRESSED OR HOPELESS: NOT AT ALL
1. LITTLE INTEREST OR PLEASURE IN DOING THINGS: 0
1. LITTLE INTEREST OR PLEASURE IN DOING THINGS: NOT AT ALL
SUM OF ALL RESPONSES TO PHQ QUESTIONS 1-9: 0
SUM OF ALL RESPONSES TO PHQ QUESTIONS 1-9: 0
SUM OF ALL RESPONSES TO PHQ9 QUESTIONS 1 & 2: 0
SUM OF ALL RESPONSES TO PHQ QUESTIONS 1-9: 0
SUM OF ALL RESPONSES TO PHQ9 QUESTIONS 1 & 2: 0

## 2023-12-04 NOTE — PROGRESS NOTES
Pt presents today for pap smear and breast exam.      Mammo:  Pap smear:  Contraception:Mirena-doing good per pt     P:2  Ab:0  Bone density:NA   Colonoscopy:NA

## 2023-12-08 LAB
HPV HR 12 DNA SPEC QL NAA+PROBE: NOT DETECTED
HPV16 DNA SPEC QL NAA+PROBE: NOT DETECTED
HPV16+18+H RISK 12 DNA SPEC-IMP: NORMAL
HPV18 DNA SPEC QL NAA+PROBE: NOT DETECTED

## 2024-12-05 ENCOUNTER — OFFICE VISIT (OUTPATIENT)
Dept: OBGYN CLINIC | Age: 44
End: 2024-12-05
Payer: COMMERCIAL

## 2024-12-05 VITALS
HEART RATE: 103 BPM | DIASTOLIC BLOOD PRESSURE: 80 MMHG | WEIGHT: 173 LBS | BODY MASS INDEX: 27.1 KG/M2 | SYSTOLIC BLOOD PRESSURE: 136 MMHG

## 2024-12-05 DIAGNOSIS — Z12.4 SCREENING FOR CERVICAL CANCER: ICD-10-CM

## 2024-12-05 DIAGNOSIS — Z12.31 ENCOUNTER FOR SCREENING MAMMOGRAM FOR MALIGNANT NEOPLASM OF BREAST: ICD-10-CM

## 2024-12-05 DIAGNOSIS — Z12.39 SCREENING BREAST EXAMINATION: ICD-10-CM

## 2024-12-05 DIAGNOSIS — Z30.431 INTRAUTERINE DEVICE SURVEILLANCE: ICD-10-CM

## 2024-12-05 DIAGNOSIS — Z01.419 WELL WOMAN EXAM WITH ROUTINE GYNECOLOGICAL EXAM: Primary | ICD-10-CM

## 2024-12-05 LAB — HPV16+18+H RISK 12 DNA SPEC-IMP: NORMAL

## 2024-12-05 PROCEDURE — 99396 PREV VISIT EST AGE 40-64: CPT | Performed by: NURSE PRACTITIONER

## 2024-12-05 ASSESSMENT — ENCOUNTER SYMPTOMS
RESPIRATORY NEGATIVE: 1
ALLERGIC/IMMUNOLOGIC NEGATIVE: 1
DIARRHEA: 0
GASTROINTESTINAL NEGATIVE: 1
EYES NEGATIVE: 1
CONSTIPATION: 0

## 2024-12-05 NOTE — PATIENT INSTRUCTIONS
6/28/2022.  Care instructions adapted under license by [facility]. If you have questions about a medical condition or this instruction, always ask your healthcare professional. Healthwise, Incorporated disclaims any warranty or liability for your use of this information.

## 2024-12-05 NOTE — PROGRESS NOTES
Pt presents today for pap smear and breast exam.     Mammo:2023  Pap smear:  Contraception:Mirena-doing good per pt     P:2  Ab:0  Bone density:NA  Colonoscopy:NA  
pain or bleeding. Use a non-hormone method of birth control (condom, diaphragm, cervical cap, or contraceptive sponge) to prevent pregnancy until your doctor is able to replace the IUD.  If you need to have an MRI (magnetic resonance imaging), tell your caregivers ahead of time that you have an IUD in place.  Your IUD may be removed at any time you decide to stop using birth control. Mirena or Liletta must be removed at the end of the 6-year wearing time. Kyleena must be removed after 5 years, and Octavia must be removed after 3 years. Your doctor can insert a new device if you wish to continue using this form of birth control. Only your doctor should remove the IUD. Do not attempt to remove the device yourself.  If you decide to use a different method of birth control, you may need to start using it a week before your IUD is removed.  What happens if I miss a dose?  Since the IUD continuously releases a low dose of levonorgestrel, missing a dose does not occur when using this form of levonorgestrel.  What happens if I overdose?  An overdose of levonorgestrel released from the intrauterine system is very unlikely to occur.  What should I avoid while using levonorgestrel intrauterine system?  Avoid having more than one sex partner. The IUD can increase your risk of developing a serious pelvic infection, which is often caused by sexually transmitted disease. Levonorgestrel intrauterine system will not protect you from sexually transmitted diseases, including HIV and AIDS. Using a condom may help protect from these diseases.  Call your doctor if your sex partner develops HIV or a sexually transmitted disease, or if you have any change in sexual relationships.  What are the possible side effects of levonorgestrel intrauterine system?  Get emergency medical help if you have signs of an allergic reaction: hives; difficult breathing; swelling of your face, lips, tongue, or throat.  Get emergency medical help if you have

## 2025-02-02 PROCEDURE — 87636 SARSCOV2 & INF A&B AMP PRB: CPT | Performed by: NURSE PRACTITIONER
